# Patient Record
Sex: MALE | Race: WHITE | NOT HISPANIC OR LATINO | Employment: FULL TIME | URBAN - METROPOLITAN AREA
[De-identification: names, ages, dates, MRNs, and addresses within clinical notes are randomized per-mention and may not be internally consistent; named-entity substitution may affect disease eponyms.]

---

## 2018-10-15 ENCOUNTER — OFFICE VISIT (OUTPATIENT)
Dept: FAMILY MEDICINE | Facility: CLINIC | Age: 53
End: 2018-10-15
Payer: COMMERCIAL

## 2018-10-15 VITALS
SYSTOLIC BLOOD PRESSURE: 114 MMHG | OXYGEN SATURATION: 98 % | HEART RATE: 83 BPM | BODY MASS INDEX: 23.46 KG/M2 | DIASTOLIC BLOOD PRESSURE: 76 MMHG | HEIGHT: 68 IN | WEIGHT: 154.8 LBS | TEMPERATURE: 97.8 F

## 2018-10-15 DIAGNOSIS — Z23 NEED FOR PROPHYLACTIC VACCINATION AND INOCULATION AGAINST INFLUENZA: ICD-10-CM

## 2018-10-15 DIAGNOSIS — Z11.4 SCREENING FOR HUMAN IMMUNODEFICIENCY VIRUS: ICD-10-CM

## 2018-10-15 DIAGNOSIS — Z00.00 ENCOUNTER FOR ROUTINE ADULT HEALTH EXAMINATION WITHOUT ABNORMAL FINDINGS: Primary | ICD-10-CM

## 2018-10-15 DIAGNOSIS — Z11.59 NEED FOR HEPATITIS C SCREENING TEST: ICD-10-CM

## 2018-10-15 DIAGNOSIS — Z72.0 TOBACCO ABUSE: ICD-10-CM

## 2018-10-15 DIAGNOSIS — Z13.220 LIPID SCREENING: ICD-10-CM

## 2018-10-15 DIAGNOSIS — Z12.11 SCREENING FOR MALIGNANT NEOPLASM OF COLON: ICD-10-CM

## 2018-10-15 PROCEDURE — 99386 PREV VISIT NEW AGE 40-64: CPT | Mod: 25 | Performed by: FAMILY MEDICINE

## 2018-10-15 PROCEDURE — 90732 PPSV23 VACC 2 YRS+ SUBQ/IM: CPT | Performed by: FAMILY MEDICINE

## 2018-10-15 PROCEDURE — 90471 IMMUNIZATION ADMIN: CPT | Performed by: FAMILY MEDICINE

## 2018-10-15 PROCEDURE — 90686 IIV4 VACC NO PRSV 0.5 ML IM: CPT | Performed by: FAMILY MEDICINE

## 2018-10-15 PROCEDURE — 90472 IMMUNIZATION ADMIN EACH ADD: CPT | Performed by: FAMILY MEDICINE

## 2018-10-15 NOTE — PROGRESS NOTES

## 2018-10-15 NOTE — PROGRESS NOTES
SUBJECTIVE:   CC: Mario David is an 53 year old male who presents for preventative health visit.     Healthy Habits:    Do you get at least three servings of calcium containing foods daily (dairy, green leafy vegetables, etc.)? yes    Amount of exercise or daily activities, outside of work: none    Problems taking medications regularly not applicable    Medication side effects: Na    Have you had an eye exam in the past two years? yes    Do you see a dentist twice per year? yes    Do you have sleep apnea, excessive snoring or daytime drowsiness?no     No other concerns.    Patient denies BM or urinary changes.  No fam hx of colon cancer.  No fam hx of prostate cancer.    Today's PHQ-2 Score:   PHQ-2 ( 1999 Pf[employer]) 10/15/2018 11/26/2012   Q1: Little interest or pleasure in doing things 0 0   Q2: Feeling down, depressed or hopeless 0 0   PHQ-2 Score 0 0       Abuse: Current or Past(Physical, Sexual or Emotional)- No  Do you feel safe in your environment - Yes    Social History   Substance Use Topics     Smoking status: Current Every Day Smoker     Packs/day: 1.00     Years: 30.00     Types: Cigarettes     Smokeless tobacco: Never Used     Alcohol use Yes      Comment: 2 drinks on the weekend      Patient states he has been encouraged by family members to quit smoking.  Patient will be starting nicotine patches.     If you drink alcohol do you typically have >3 drinks per day or >7 drinks per week? No                      Last PSA:   PSA   Date Value Ref Range Status   06/22/2011 0.96 0 - 4 ug/L Final       Reviewed orders with patient. Reviewed health maintenance and updated orders accordingly - Yes  BP Readings from Last 3 Encounters:   10/15/18 114/76   06/19/13 121/73   11/26/12 108/69    Wt Readings from Last 3 Encounters:   10/15/18 154 lb 12.8 oz (70.2 kg)   06/19/13 163 lb (73.9 kg)   11/26/12 153 lb (69.4 kg)                  Patient Active Problem List   Diagnosis     CARDIOVASCULAR SCREENING; LDL GOAL LESS  "THAN 130     Tobacco abuse     History reviewed. No pertinent surgical history.    Social History   Substance Use Topics     Smoking status: Current Every Day Smoker     Packs/day: 1.00     Years: 30.00     Types: Cigarettes     Smokeless tobacco: Never Used     Alcohol use Yes      Comment: 2 drinks on the weekend     History reviewed. No pertinent family history.      Current Outpatient Prescriptions   Medication Sig Dispense Refill     aspirin 81 MG tablet Take 1 tablet (81 mg) by mouth daily 90 tablet 3     NO ACTIVE MEDICATIONS .       No Known Allergies    Reviewed and updated as needed this visit by clinical staff  Tobacco  Allergies  Meds  Problems  Med Hx  Surg Hx  Fam Hx  Soc Hx          Reviewed and updated as needed this visit by Provider  Allergies  Meds  Problems        History reviewed. No pertinent past medical history.   History reviewed. No pertinent surgical history.    ROS:  CONSTITUTIONAL: NEGATIVE for fever, chills, change in weight  INTEGUMENTARY/SKIN: NEGATIVE for worrisome rashes, moles or lesions  EYES: NEGATIVE for vision changes or irritation  ENT: NEGATIVE for ear, mouth and throat problems  RESP: NEGATIVE for significant cough or SOB  CV: NEGATIVE for chest pain, palpitations or peripheral edema  GI: NEGATIVE for nausea, abdominal pain, heartburn, or change in bowel habits   male: negative for dysuria, hematuria, decreased urinary stream, erectile dysfunction, urethral discharge  MUSCULOSKELETAL: NEGATIVE for significant arthralgias or myalgia  NEURO: NEGATIVE for weakness, dizziness or paresthesias  ENDOCRINE: NEGATIVE for temperature intolerance, skin/hair changes  HEME/ALLERGY/IMMUNE: NEGATIVE for bleeding problems  PSYCHIATRIC: NEGATIVE for changes in mood or affect    OBJECTIVE:   /76  Pulse 83  Temp 97.8  F (36.6  C) (Tympanic)  Ht 5' 8\" (1.727 m)  Wt 154 lb 12.8 oz (70.2 kg)  SpO2 98%  BMI 23.54 kg/m2  EXAM:  GENERAL APPEARANCE: well-nourished, alert and " no distress  EYES: pink conj, no icterus, PERRL, EOMI  HENT: ear canals and TM's normal, nose and mouth without ulcers or lesions, oropharynx clear and oral mucous membranes moist  NECK: no adenopathy, no asymmetry, masses, or scars and thyroid normal to palpation  RESP: lungs clear to auscultation - no rales, rhonchi or wheezes  CV: regular rates and rhythm, normal S1 S2, no S3 or S4, no murmur, click or rub, no peripheral edema and peripheral pulses strong  ABDOMEN: soft, nontender, no hepatosplenomegaly, no masses and bowel sounds normal  RECTAL: deferred  MS: no musculoskeletal defects are noted and gait is age appropriate without ataxia  SKIN: no suspicious lesions or rashes  NEURO: Normal strength and tone, sensory exam grossly normal, mentation intact and speech normal    Diagnostic Test Results:  none     ASSESSMENT/PLAN:   Mario was seen today for physical, flu shot and health maintenance.    Diagnoses and all orders for this visit:    Encounter for routine adult health examination without abnormal findings  -     **Glucose FUTURE anytime; Future  Patient was advised on recommended screening and preventive health recommendations.  He verbalized understanding and agreed to the plans below.    Screening for human immunodeficiency virus  -     **HIV Antigen Antibody Combo FUTURE anytime; Future  Offered screening based on current recommendations. Patient concurred to screen.    Need for hepatitis C screening test  -     **Hepatitis C Screen Reflex to RNA FUTURE anytime; Future  Offered screening based on current recommendations. Patient concurred to screen.    Lipid screening  -     Lipid panel reflex to direct LDL Fasting; Future    Screening for malignant neoplasm of colon  -     GASTROENTEROLOGY ADULT REF PROCEDURE ONLY [facility] [phone]; [place] [facility] Surgeon    Tobacco abuse  -     aspirin 81 MG tablet; Take 1 tablet (81 mg) by mouth daily  Patient was advised of risks of continuing to smoke  "tobacco.  Patient was advised on use of nicotine patches.    Need for prophylactic vaccination and inoculation against influenza  -     FLU VACCINE, SPLIT VIRUS, IM (QUADRIVALENT) [44356]- >3 YRS  -     Pneumococcal vaccine 23 valent PPSV23  (Pneumovax) [69768]  -     Vaccine Administration, Initial [48907]  -     Vaccine Administration, Each Additional [36540]          COUNSELING:  Reviewed preventive health counseling, as reflected in patient instructions       Regular exercise       Healthy diet/nutrition       Vision screening       Hearing screening       Immunizations    Vaccinated for: Influenza and Pneumococcal             Aspirin Prophylaxsis       Alcohol Use       Safe sex practices/STD prevention       Consider Hep C screening for patients born between 1945 and 1965       HIV screeninx in teen years, 1x in adult years, and at intervals if high risk       Colon cancer screening       Prostate cancer screening       Osteoporosis Prevention/Bone Health       One time pneumovax for smokers       The ASCVD Risk score (Neha WILLIS Jr, et al., 2013) failed to calculate for the following reasons:    Cannot find a previous HDL lab    Cannot find a previous total cholesterol lab    BP Readings from Last 1 Encounters:   10/15/18 114/76     Estimated body mass index is 23.54 kg/(m^2) as calculated from the following:    Height as of this encounter: 5' 8\" (1.727 m).    Weight as of this encounter: 154 lb 12.8 oz (70.2 kg).           reports that he has been smoking Cigarettes.  He has a 30.00 pack-year smoking history. He has never used smokeless tobacco.  Tobacco Cessation Action Plan: Pharmacotherapies : Nicotine patch    Counseling Resources:  ATP IV Guidelines  Pooled Cohorts Equation Calculator  FRAX Risk Assessment  ICSI Preventive Guidelines  Dietary Guidelines for Americans, 2010  USDA's MyPlate  ASA Prophylaxis  Lung CA Screening    Jeovanny Hernandez MD  [facility]  "

## 2018-10-15 NOTE — MR AVS SNAPSHOT
After Visit Summary   10/15/2018    Mario David    MRN: [id number]           Patient Information     Date Of Birth          1965        Visit Information        Provider Department      10/15/2018 3:00 PM Jeovanny Hernandez MD [facility]        Today's Diagnoses     Encounter for routine adult health examination without abnormal findings    -  1    Screening for human immunodeficiency virus        Need for hepatitis C screening test        Lipid screening        Screening for malignant neoplasm of colon        Tobacco abuse          Care Instructions    Schedule your lab tests; make sure you are fasting for more than 12 hrs.  Schedule colonoscopy for screening for colon cancer at your soonest convenience.    You are strongly advised to stop smoking soon!  Continuing to smoke will increase your risk for heart disease, stroke, cancer, and  lung damage.  When you start the nicotine patches, stop smoking completely.    Flu shot and pneumonia vaccine today.    Start Aspirin 81 mg orally daily for cardiovascular protection.    Preventive Health Recommendations  Male Ages 50 - 64    Yearly exam:             See your health care provider every year in order to  o   Review health changes.   o   Discuss preventive care.    o   Review your medicines if your doctor has prescribed any.     Have a cholesterol test every 5 years, or more frequently if you are at risk for high cholesterol/heart disease.     Have a diabetes test (fasting glucose) every three years. If you are at risk for diabetes, you should have this test more often.     Have a colonoscopy at age 50, or have a yearly FIT test (stool test). These exams will check for colon cancer.      Talk with your health care provider about whether or not a prostate cancer screening test (PSA) is right for you.    You should be tested each year for STDs (sexually transmitted diseases), if you re at risk.     Shots: Get a flu shot each year.  Get a tetanus shot every 10 years.     Nutrition:    Eat at least 5 servings of fruits and vegetables daily.     Eat whole-grain bread, whole-wheat pasta and brown rice instead of white grains and rice.     Get adequate Calcium and Vitamin D.     Lifestyle    Exercise for at least 150 minutes a week (30 minutes a day, 5 days a week). This will help you control your weight and prevent disease.     Limit alcohol to one drink per day.     No smoking.     Wear sunscreen to prevent skin cancer.     See your dentist every six months for an exam and cleaning.     See your eye doctor every 1 to 2 years.            Follow-ups after your visit        Additional Services     GASTROENTEROLOGY ADULT REF PROCEDURE ONLY [facility] [phone]; [place] [facility] Surgeon       Last Lab Result: Creatinine (mg/dL)       Date                     Value                 06/22/2011               0.78             ----------  Body mass index is 23.54 kg/(m^2).     Needed:  Data Unavailable  Language:  English    Patient will be contacted to schedule procedure.     Please be aware that coverage of these services is subject to the terms and limitations of your health insurance plan.  Call member services at your health plan with any benefit or coverage questions.  Any procedures must be performed at a [place] facility OR coordinated by your clinic's referral office.    Please bring the following with you to your appointment:    (1) Any X-Rays, CTs or MRIs which have been performed.  Contact the facility where they were done to arrange for  prior to your scheduled appointment.    (2) List of current medications   (3) This referral request   (4) Any documents/labs given to you for this referral                  Future tests that were ordered for you today     Open Future Orders        Priority Expected Expires Ordered    **Glucose FUTURE anytime Routine 10/15/2018 10/15/2019 10/15/2018    **Hepatitis C Screen Reflex to  "RNA FUTURE anytime Routine 10/15/2018 10/15/2019 10/15/2018    **HIV Antigen Antibody Combo FUTURE anytime Routine 10/15/2018 10/15/2019 10/15/2018    Lipid panel reflex to direct LDL Fasting Routine 10/15/2018 10/15/2019 10/15/2018            Who to contact     If you have questions or need follow up information about today's clinic visit or your schedule please contact [facility] directly at [phone].  Normal or non-critical lab and imaging results will be communicated to you by MyChart, letter or phone within 4 business days after the clinic has received the results. If you do not hear from us within 7 days, please contact the clinic through MyChart or phone. If you have a critical or abnormal lab result, we will notify you by phone as soon as possible.  Submit refill requests through [employer] or call your pharmacy and they will forward the refill request to us. Please allow 3 business days for your refill to be completed.          Additional Information About Your Visit        Care EveryWhere ID     This is your Care EveryWhere ID. This could be used by other organizations to access your [facility] medical records  [id number]        Your Vitals Were     Pulse Temperature Height Pulse Oximetry BMI (Body Mass Index)       83 97.8  F (36.6  C) (Tympanic) 5' 8\" (1.727 m) 98% 23.54 kg/m2        Blood Pressure from Last 3 Encounters:   10/15/18 114/76   06/19/13 121/73   11/26/12 108/69    Weight from Last 3 Encounters:   10/15/18 154 lb 12.8 oz (70.2 kg)   06/19/13 163 lb (73.9 kg)   11/26/12 153 lb (69.4 kg)              We Performed the Following     GASTROENTEROLOGY ADULT REF PROCEDURE ONLY [facility] [phone]; [facility] [facility] Surgeon          Today's Medication Changes          These changes are accurate as of 10/15/18  3:56 PM.  If you have any questions, ask your nurse or doctor.               Start taking these medicines.        Dose/Directions    aspirin 81 MG tablet   Used for:  " Tobacco abuse   Started by:  Jeovanny Hernandez MD        Dose:  81 mg   Take 1 tablet (81 mg) by mouth daily   Quantity:  90 tablet   Refills:  3            Where to get your medicines      Some of these will need a paper prescription and others can be bought over the counter.  Ask your nurse if you have questions.     You don't need a prescription for these medications     aspirin 81 MG tablet                Primary Care Provider Office Phone # Fax #    Mhd Jana Pisano [phone]       [facility] [address] [facility] [address]        Equal Access to Services     [facility] AH: Hadii aad ku hadasho Soomaali, waaxda luqadaha, qaybta kaalmada adebraedenyajenniffer, clifford guzman . So [facility] [phone].    ATENCIÓN: Si habla español, tiene a solitario disposición servicios gratuitos de asistencia lingüística. [facility] [phone].    We comply with applicable federal civil rights laws and [place] laws. We do not discriminate on the basis of race, color, national origin, age, disability, sex, sexual orientation, or gender identity.            Thank you!     Thank you for choosing [facility]  for your care. Our goal is always to provide you with excellent care. Hearing back from our patients is one way we can continue to improve our services. Please take a few minutes to complete the written survey that you may receive in the mail after your visit with us. Thank you!             Your Updated Medication List - Protect others around you: Learn how to safely use, store and throw away your medicines at www.disposemymeds.org.          This list is accurate as of 10/15/18  3:56 PM.  Always use your most recent med list.                   Brand Name Dispense Instructions for use Diagnosis    aspirin 81 MG tablet     90 tablet    Take 1 tablet (81 mg) by mouth daily    Tobacco abuse       NO ACTIVE MEDICATIONS      .

## 2018-10-15 NOTE — PATIENT INSTRUCTIONS
Schedule your lab tests; make sure you are fasting for more than 12 hrs.  Schedule colonoscopy for screening for colon cancer at your soonest convenience.    You are strongly advised to stop smoking soon!  Continuing to smoke will increase your risk for heart disease, stroke, cancer, and  lung damage.  When you start the nicotine patches, stop smoking completely.    Flu shot and pneumonia vaccine today.    Start Aspirin 81 mg orally daily for cardiovascular protection.    Preventive Health Recommendations  Male Ages 50 - 64    Yearly exam:             See your health care provider every year in order to  o   Review health changes.   o   Discuss preventive care.    o   Review your medicines if your doctor has prescribed any.     Have a cholesterol test every 5 years, or more frequently if you are at risk for high cholesterol/heart disease.     Have a diabetes test (fasting glucose) every three years. If you are at risk for diabetes, you should have this test more often.     Have a colonoscopy at age 50, or have a yearly FIT test (stool test). These exams will check for colon cancer.      Talk with your health care provider about whether or not a prostate cancer screening test (PSA) is right for you.    You should be tested each year for STDs (sexually transmitted diseases), if you re at risk.     Shots: Get a flu shot each year. Get a tetanus shot every 10 years.     Nutrition:    Eat at least 5 servings of fruits and vegetables daily.     Eat whole-grain bread, whole-wheat pasta and brown rice instead of white grains and rice.     Get adequate Calcium and Vitamin D.     Lifestyle    Exercise for at least 150 minutes a week (30 minutes a day, 5 days a week). This will help you control your weight and prevent disease.     Limit alcohol to one drink per day.     No smoking.     Wear sunscreen to prevent skin cancer.     See your dentist every six months for an exam and cleaning.     See your eye doctor every 1 to 2  years.

## 2018-10-20 DIAGNOSIS — Z00.00 ENCOUNTER FOR ROUTINE ADULT HEALTH EXAMINATION WITHOUT ABNORMAL FINDINGS: ICD-10-CM

## 2018-10-20 DIAGNOSIS — Z13.220 LIPID SCREENING: ICD-10-CM

## 2018-10-20 DIAGNOSIS — Z11.59 NEED FOR HEPATITIS C SCREENING TEST: ICD-10-CM

## 2018-10-20 DIAGNOSIS — E78.2 MIXED HYPERLIPIDEMIA: Primary | ICD-10-CM

## 2018-10-20 DIAGNOSIS — Z11.4 SCREENING FOR HUMAN IMMUNODEFICIENCY VIRUS: ICD-10-CM

## 2018-10-20 LAB
CHOLEST SERPL-MCNC: 215 MG/DL
GLUCOSE SERPL-MCNC: 100 MG/DL (ref 70–99)
HCV AB SERPL QL IA: NONREACTIVE
HDLC SERPL-MCNC: 62 MG/DL
HIV 1+2 AB+HIV1 P24 AG SERPL QL IA: NONREACTIVE
LDLC SERPL CALC-MCNC: 140 MG/DL
NONHDLC SERPL-MCNC: 153 MG/DL
TRIGL SERPL-MCNC: 65 MG/DL

## 2018-10-20 PROCEDURE — 86803 HEPATITIS C AB TEST: CPT | Performed by: FAMILY MEDICINE

## 2018-10-20 PROCEDURE — 82947 ASSAY GLUCOSE BLOOD QUANT: CPT | Performed by: FAMILY MEDICINE

## 2018-10-20 PROCEDURE — 87389 HIV-1 AG W/HIV-1&-2 AB AG IA: CPT | Performed by: FAMILY MEDICINE

## 2018-10-20 PROCEDURE — 36415 COLL VENOUS BLD VENIPUNCTURE: CPT | Performed by: FAMILY MEDICINE

## 2018-10-20 PROCEDURE — 80061 LIPID PANEL: CPT | Performed by: FAMILY MEDICINE

## 2018-10-20 NOTE — LETTER
"October 22, 2018      Mario David  [address] [place] [id number]        Dear ,    We are writing to inform you of your test results. ( below is a copy of what was discussed over the phone regarding your lab test results)     Total cholesterol is slightly high and LDL \"bad\" fat is borderline high.  Fasting glucose (sugar) is borderline high.  All other tests are normal.    Be consistent with low fat diet and regular exercise.  Please send letter with the following and a hyperlipidemia packet.    Be consistent with low trans fat and saturated fat diet.  Eat food rich in omega-3-fatty acids as you tolerate. (salmon, olive oil)  Eat 5 cups of vegetables, fruits and whole grains per day.  Limit starchy food (white rice, white bread, white pasta, white potatoes) to less than a cup per meal.  Minimize sweets, junk food and fastfood. Limit soda beverages to one serving per day; best to avoid it altogether though.  Exercise: moderate intensity sustained for at least 30 mins per episode, goal of 150 mins per week at least  Combine cardiovascular and resistance exercises.  These exercise recommendations are in addition to your daily activity at work or home.  Work on losing weight if you are above your goal body mass index.    Recheck fasting lipid panel in 6 months.      If you have any questions or concerns, please call the clinic at the number listed above.       Sincerely,      Dr. Hernandez/aleshia                 "

## 2018-11-08 ENCOUNTER — HOSPITAL ENCOUNTER (OUTPATIENT)
Facility: CLINIC | Age: 53
End: 2018-11-08
Attending: SURGERY | Admitting: SURGERY
Payer: COMMERCIAL

## 2018-12-16 ENCOUNTER — APPOINTMENT (OUTPATIENT)
Dept: GENERAL RADIOLOGY | Facility: CLINIC | Age: 53
End: 2018-12-16
Attending: EMERGENCY MEDICINE
Payer: COMMERCIAL

## 2018-12-16 ENCOUNTER — APPOINTMENT (OUTPATIENT)
Dept: CT IMAGING | Facility: CLINIC | Age: 53
End: 2018-12-16
Attending: EMERGENCY MEDICINE
Payer: COMMERCIAL

## 2018-12-16 ENCOUNTER — HOSPITAL ENCOUNTER (INPATIENT)
Facility: CLINIC | Age: 53
LOS: 2 days | Discharge: HOME | End: 2018-12-18
Attending: EMERGENCY MEDICINE | Admitting: FAMILY MEDICINE
Payer: COMMERCIAL
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
## 2018-12-16 DIAGNOSIS — S92.001A CLOSED FRACTURE OF BOTH CALCANEI, INITIAL ENCOUNTER: ICD-10-CM
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
## 2018-12-16 DIAGNOSIS — S92.002A CLOSED FRACTURE OF BOTH CALCANEI, INITIAL ENCOUNTER: ICD-10-CM

## 2018-12-16 LAB
ALBUMIN UR-MCNC: NEGATIVE MG/DL
ANION GAP SERPL CALCULATED.3IONS-SCNC: 7 MMOL/L (ref 3–14)
APPEARANCE UR: CLEAR
BASOPHILS # BLD AUTO: 0 10E9/L (ref 0–0.2)
BASOPHILS NFR BLD AUTO: 0.4 %
BILIRUB UR QL STRIP: NEGATIVE
BUN SERPL-MCNC: 12 MG/DL (ref 7–30)
CALCIUM SERPL-MCNC: 8.5 MG/DL (ref 8.5–10.1)
CHLORIDE SERPL-SCNC: 104 MMOL/L (ref 94–109)
CO2 SERPL-SCNC: 26 MMOL/L (ref 20–32)
COLOR UR AUTO: YELLOW
CREAT SERPL-MCNC: 0.75 MG/DL (ref 0.66–1.25)
DIFFERENTIAL METHOD BLD: ABNORMAL
EOSINOPHIL # BLD AUTO: 0.1 10E9/L (ref 0–0.7)
EOSINOPHIL NFR BLD AUTO: 0.7 %
ERYTHROCYTE [DISTWIDTH] IN BLOOD BY AUTOMATED COUNT: 12.1 % (ref 10–15)
GFR SERPL CREATININE-BSD FRML MDRD: >90 ML/MIN/1.7M2
GLUCOSE SERPL-MCNC: 102 MG/DL (ref 70–99)
GLUCOSE UR STRIP-MCNC: NEGATIVE MG/DL
HCT VFR BLD AUTO: 43 % (ref 40–53)
HGB BLD-MCNC: 14.4 G/DL (ref 13.3–17.7)
HGB UR QL STRIP: NEGATIVE
IMM GRANULOCYTES # BLD: 0 10E9/L (ref 0–0.4)
IMM GRANULOCYTES NFR BLD: 0.4 %
KETONES UR STRIP-MCNC: NEGATIVE MG/DL
LEUKOCYTE ESTERASE UR QL STRIP: NEGATIVE
LYMPHOCYTES # BLD AUTO: 1.3 10E9/L (ref 0.8–5.3)
LYMPHOCYTES NFR BLD AUTO: 12.5 %
MCH RBC QN AUTO: 32.6 PG (ref 26.5–33)
MCHC RBC AUTO-ENTMCNC: 33.5 G/DL (ref 31.5–36.5)
MCV RBC AUTO: 97 FL (ref 78–100)
MONOCYTES # BLD AUTO: 0.5 10E9/L (ref 0–1.3)
MONOCYTES NFR BLD AUTO: 4.6 %
MUCOUS THREADS #/AREA URNS LPF: PRESENT /LPF
NEUTROPHILS # BLD AUTO: 8.7 10E9/L (ref 1.6–8.3)
NEUTROPHILS NFR BLD AUTO: 81.4 %
NITRATE UR QL: NEGATIVE
NRBC # BLD AUTO: 0 10*3/UL
NRBC BLD AUTO-RTO: 0 /100
PH UR STRIP: 5 PH (ref 5–7)
PLATELET # BLD AUTO: 216 10E9/L (ref 150–450)
POTASSIUM SERPL-SCNC: 4 MMOL/L (ref 3.4–5.3)
RBC # BLD AUTO: 4.42 10E12/L (ref 4.4–5.9)
RBC #/AREA URNS AUTO: <1 /HPF (ref 0–2)
SODIUM SERPL-SCNC: 137 MMOL/L (ref 133–144)
SOURCE: ABNORMAL
SP GR UR STRIP: 1.01 (ref 1–1.03)
UROBILINOGEN UR STRIP-MCNC: 0 MG/DL (ref 0–2)
WBC # BLD AUTO: 10.7 10E9/L (ref 4–11)
WBC #/AREA URNS AUTO: <1 /HPF (ref 0–5)

## 2018-12-16 PROCEDURE — 25000128 H RX IP 250 OP 636: Performed by: EMERGENCY MEDICINE

## 2018-12-16 PROCEDURE — 96376 TX/PRO/DX INJ SAME DRUG ADON: CPT | Performed by: EMERGENCY MEDICINE

## 2018-12-16 PROCEDURE — 99285 EMERGENCY DEPT VISIT HI MDM: CPT | Mod: 25 | Performed by: EMERGENCY MEDICINE

## 2018-12-16 PROCEDURE — 81001 URINALYSIS AUTO W/SCOPE: CPT | Performed by: EMERGENCY MEDICINE

## 2018-12-16 PROCEDURE — 73610 X-RAY EXAM OF ANKLE: CPT | Mod: 50

## 2018-12-16 PROCEDURE — 72128 CT CHEST SPINE W/O DYE: CPT

## 2018-12-16 PROCEDURE — 73630 X-RAY EXAM OF FOOT: CPT | Mod: 50

## 2018-12-16 PROCEDURE — 85025 COMPLETE CBC W/AUTO DIFF WBC: CPT | Performed by: EMERGENCY MEDICINE

## 2018-12-16 PROCEDURE — 72131 CT LUMBAR SPINE W/O DYE: CPT

## 2018-12-16 PROCEDURE — 96372 THER/PROPH/DIAG INJ SC/IM: CPT | Performed by: EMERGENCY MEDICINE

## 2018-12-16 PROCEDURE — 80048 BASIC METABOLIC PNL TOTAL CA: CPT | Performed by: EMERGENCY MEDICINE

## 2018-12-16 PROCEDURE — 12000000 ZZH R&B MED SURG/OB

## 2018-12-16 PROCEDURE — 73700 CT LOWER EXTREMITY W/O DYE: CPT | Mod: 50

## 2018-12-16 PROCEDURE — 96374 THER/PROPH/DIAG INJ IV PUSH: CPT | Performed by: EMERGENCY MEDICINE

## 2018-12-16 RX ORDER — SODIUM CHLORIDE 9 MG/ML
INJECTION, SOLUTION INTRAVENOUS CONTINUOUS
Status: DISCONTINUED | OUTPATIENT
Start: 2018-12-16 | End: 2018-12-17

## 2018-12-16 RX ORDER — NALOXONE HYDROCHLORIDE 0.4 MG/ML
.1-.4 INJECTION, SOLUTION INTRAMUSCULAR; INTRAVENOUS; SUBCUTANEOUS
Status: DISCONTINUED | OUTPATIENT
Start: 2018-12-16 | End: 2018-12-17

## 2018-12-16 RX ORDER — HYDROMORPHONE HYDROCHLORIDE 1 MG/ML
.3-.5 INJECTION, SOLUTION INTRAMUSCULAR; INTRAVENOUS; SUBCUTANEOUS
Status: DISCONTINUED | OUTPATIENT
Start: 2018-12-16 | End: 2018-12-18 | Stop reason: HOSPADM

## 2018-12-16 RX ORDER — ONDANSETRON 2 MG/ML
4 INJECTION INTRAMUSCULAR; INTRAVENOUS EVERY 6 HOURS PRN
Status: DISCONTINUED | OUTPATIENT
Start: 2018-12-16 | End: 2018-12-17

## 2018-12-16 RX ORDER — HYDROMORPHONE HYDROCHLORIDE 1 MG/ML
0.5 INJECTION, SOLUTION INTRAMUSCULAR; INTRAVENOUS; SUBCUTANEOUS ONCE
Status: COMPLETED | OUTPATIENT
Start: 2018-12-16 | End: 2018-12-16

## 2018-12-16 RX ORDER — ONDANSETRON 4 MG/1
4 TABLET, ORALLY DISINTEGRATING ORAL EVERY 6 HOURS PRN
Status: DISCONTINUED | OUTPATIENT
Start: 2018-12-16 | End: 2018-12-17

## 2018-12-16 RX ADMIN — ENOXAPARIN SODIUM 40 MG: 40 INJECTION SUBCUTANEOUS at 22:39

## 2018-12-16 RX ADMIN — SODIUM CHLORIDE: 9 INJECTION, SOLUTION INTRAVENOUS at 23:47

## 2018-12-16 RX ADMIN — Medication 0.5 MG: at 17:26

## 2018-12-16 RX ADMIN — HYDROMORPHONE HYDROCHLORIDE 0.5 MG: 1 INJECTION, SOLUTION INTRAMUSCULAR; INTRAVENOUS; SUBCUTANEOUS at 22:48

## 2018-12-16 ASSESSMENT — MIFFLIN-ST. JEOR
SCORE: 1540.26
SCORE: 1471.5

## 2018-12-16 NOTE — ED NOTES
Occurred approx 1hr ago took 4 ibuprofen   No obvious deformity or swelling has bilat pain  Denies any other pain or discomfort

## 2018-12-16 NOTE — ED TRIAGE NOTES
Pt fell 13 ft off camper and landed on his heels, has foot and ankle pain. Trauma eval broadcasted.

## 2018-12-17 PROCEDURE — 25000132 ZZH RX MED GY IP 250 OP 250 PS 637: Performed by: PHYSICIAN ASSISTANT

## 2018-12-17 PROCEDURE — 25000128 H RX IP 250 OP 636: Performed by: PHYSICIAN ASSISTANT

## 2018-12-17 PROCEDURE — 99223 1ST HOSP IP/OBS HIGH 75: CPT | Mod: AI | Performed by: PHYSICIAN ASSISTANT

## 2018-12-17 PROCEDURE — 25000128 H RX IP 250 OP 636: Performed by: EMERGENCY MEDICINE

## 2018-12-17 PROCEDURE — 12000000 ZZH R&B MED SURG/OB

## 2018-12-17 RX ORDER — ACETAMINOPHEN 325 MG/1
975 TABLET ORAL 3 TIMES DAILY
Status: DISCONTINUED | OUTPATIENT
Start: 2018-12-17 | End: 2018-12-18 | Stop reason: HOSPADM

## 2018-12-17 RX ORDER — SODIUM CHLORIDE 9 MG/ML
INJECTION, SOLUTION INTRAVENOUS CONTINUOUS
Status: DISCONTINUED | OUTPATIENT
Start: 2018-12-17 | End: 2018-12-18 | Stop reason: HOSPADM

## 2018-12-17 RX ORDER — ONDANSETRON 4 MG/1
4 TABLET, ORALLY DISINTEGRATING ORAL EVERY 6 HOURS PRN
Status: DISCONTINUED | OUTPATIENT
Start: 2018-12-17 | End: 2018-12-17

## 2018-12-17 RX ORDER — PROCHLORPERAZINE MALEATE 10 MG
10 TABLET ORAL EVERY 6 HOURS PRN
Status: DISCONTINUED | OUTPATIENT
Start: 2018-12-17 | End: 2018-12-17

## 2018-12-17 RX ORDER — OXYCODONE HYDROCHLORIDE 5 MG/1
5-10 TABLET ORAL
Status: DISCONTINUED | OUTPATIENT
Start: 2018-12-17 | End: 2018-12-18 | Stop reason: HOSPADM

## 2018-12-17 RX ORDER — PROCHLORPERAZINE 25 MG
25 SUPPOSITORY, RECTAL RECTAL EVERY 12 HOURS PRN
Status: DISCONTINUED | OUTPATIENT
Start: 2018-12-17 | End: 2018-12-18 | Stop reason: HOSPADM

## 2018-12-17 RX ORDER — AMOXICILLIN 250 MG
2 CAPSULE ORAL 2 TIMES DAILY PRN
Status: DISCONTINUED | OUTPATIENT
Start: 2018-12-17 | End: 2018-12-18 | Stop reason: HOSPADM

## 2018-12-17 RX ORDER — ONDANSETRON 2 MG/ML
4 INJECTION INTRAMUSCULAR; INTRAVENOUS EVERY 6 HOURS PRN
Status: DISCONTINUED | OUTPATIENT
Start: 2018-12-17 | End: 2018-12-18 | Stop reason: HOSPADM

## 2018-12-17 RX ORDER — ONDANSETRON 4 MG/1
4 TABLET, ORALLY DISINTEGRATING ORAL EVERY 6 HOURS PRN
Status: DISCONTINUED | OUTPATIENT
Start: 2018-12-17 | End: 2018-12-18 | Stop reason: HOSPADM

## 2018-12-17 RX ORDER — NICOTINE 21 MG/24HR
1 PATCH, TRANSDERMAL 24 HOURS TRANSDERMAL DAILY
Status: DISCONTINUED | OUTPATIENT
Start: 2018-12-17 | End: 2018-12-18 | Stop reason: HOSPADM

## 2018-12-17 RX ORDER — ONDANSETRON 2 MG/ML
4 INJECTION INTRAMUSCULAR; INTRAVENOUS EVERY 6 HOURS PRN
Status: DISCONTINUED | OUTPATIENT
Start: 2018-12-17 | End: 2018-12-17

## 2018-12-17 RX ORDER — PROCHLORPERAZINE 25 MG
25 SUPPOSITORY, RECTAL RECTAL EVERY 12 HOURS PRN
Status: DISCONTINUED | OUTPATIENT
Start: 2018-12-17 | End: 2018-12-17

## 2018-12-17 RX ORDER — NALOXONE HYDROCHLORIDE 0.4 MG/ML
.1-.4 INJECTION, SOLUTION INTRAMUSCULAR; INTRAVENOUS; SUBCUTANEOUS
Status: DISCONTINUED | OUTPATIENT
Start: 2018-12-17 | End: 2018-12-17

## 2018-12-17 RX ORDER — AMOXICILLIN 250 MG
1 CAPSULE ORAL 2 TIMES DAILY PRN
Status: DISCONTINUED | OUTPATIENT
Start: 2018-12-17 | End: 2018-12-18 | Stop reason: HOSPADM

## 2018-12-17 RX ORDER — PROCHLORPERAZINE MALEATE 10 MG
10 TABLET ORAL EVERY 6 HOURS PRN
Status: DISCONTINUED | OUTPATIENT
Start: 2018-12-17 | End: 2018-12-18 | Stop reason: HOSPADM

## 2018-12-17 RX ORDER — NALOXONE HYDROCHLORIDE 0.4 MG/ML
.1-.4 INJECTION, SOLUTION INTRAMUSCULAR; INTRAVENOUS; SUBCUTANEOUS
Status: DISCONTINUED | OUTPATIENT
Start: 2018-12-17 | End: 2018-12-18 | Stop reason: HOSPADM

## 2018-12-17 RX ORDER — POLYETHYLENE GLYCOL 3350 17 G/17G
17 POWDER, FOR SOLUTION ORAL DAILY PRN
Status: DISCONTINUED | OUTPATIENT
Start: 2018-12-17 | End: 2018-12-18 | Stop reason: HOSPADM

## 2018-12-17 RX ADMIN — SENNOSIDES AND DOCUSATE SODIUM 1 TABLET: 8.6; 5 TABLET ORAL at 18:02

## 2018-12-17 RX ADMIN — OXYCODONE HYDROCHLORIDE 10 MG: 5 TABLET ORAL at 13:47

## 2018-12-17 RX ADMIN — OXYCODONE HYDROCHLORIDE 10 MG: 5 TABLET ORAL at 23:53

## 2018-12-17 RX ADMIN — HYDROMORPHONE HYDROCHLORIDE 0.5 MG: 1 INJECTION, SOLUTION INTRAMUSCULAR; INTRAVENOUS; SUBCUTANEOUS at 18:02

## 2018-12-17 RX ADMIN — ACETAMINOPHEN 975 MG: 325 TABLET, FILM COATED ORAL at 13:47

## 2018-12-17 RX ADMIN — OXYCODONE HYDROCHLORIDE 10 MG: 5 TABLET ORAL at 10:01

## 2018-12-17 RX ADMIN — OXYCODONE HYDROCHLORIDE 10 MG: 5 TABLET ORAL at 06:33

## 2018-12-17 RX ADMIN — HYDROMORPHONE HYDROCHLORIDE 0.5 MG: 1 INJECTION, SOLUTION INTRAMUSCULAR; INTRAVENOUS; SUBCUTANEOUS at 14:59

## 2018-12-17 RX ADMIN — OXYCODONE HYDROCHLORIDE 10 MG: 5 TABLET ORAL at 16:38

## 2018-12-17 RX ADMIN — ACETAMINOPHEN 975 MG: 325 TABLET, FILM COATED ORAL at 07:43

## 2018-12-17 RX ADMIN — SODIUM CHLORIDE: 9 INJECTION, SOLUTION INTRAVENOUS at 12:26

## 2018-12-17 RX ADMIN — Medication 1 MG: at 02:15

## 2018-12-17 RX ADMIN — ENOXAPARIN SODIUM 40 MG: 40 INJECTION SUBCUTANEOUS at 22:27

## 2018-12-17 RX ADMIN — OXYCODONE HYDROCHLORIDE 10 MG: 5 TABLET ORAL at 20:29

## 2018-12-17 RX ADMIN — ACETAMINOPHEN 975 MG: 325 TABLET, FILM COATED ORAL at 20:29

## 2018-12-17 RX ADMIN — HYDROMORPHONE HYDROCHLORIDE 0.5 MG: 1 INJECTION, SOLUTION INTRAMUSCULAR; INTRAVENOUS; SUBCUTANEOUS at 02:15

## 2018-12-17 RX ADMIN — OXYCODONE HYDROCHLORIDE 5 MG: 5 TABLET ORAL at 02:15

## 2018-12-17 ASSESSMENT — ACTIVITIES OF DAILY LIVING (ADL)
ADLS_ACUITY_SCORE: 13
ADLS_ACUITY_SCORE: 13
ADLS_ACUITY_SCORE: 11
ADLS_ACUITY_SCORE: 11
ADLS_ACUITY_SCORE: 13
ADLS_ACUITY_SCORE: 11

## 2018-12-17 ASSESSMENT — MIFFLIN-ST. JEOR: SCORE: 1483.5

## 2018-12-17 NOTE — PLAN OF CARE
Patient awaiting ortho consult by Dr. Murrell. Did receive a call from ortho PA to lift the NPO status and start a regular diet. Surgery will be scheduled when swelling is decreased. Will await further orders from Dr. Murrell to determine plan of care. Legs elevated on pillows, ice applied and oxycodone given. Verbalizes pain control with current pain plan. Vital signs are stable. Had a nicotine patch ordered and when this nurse went to apply it the wife had placed one from home.Will monitor.

## 2018-12-17 NOTE — ED PROVIDER NOTES
History     Chief Complaint   Patient presents with     Fall     fell 13 feet off top of [facility], landed on heels     HPI  Mario David is a 53 year old male who with a history of tobacco abuse presents emergency department complaining of bilateral foot pain status post fall.  Patient was cleaning off the road for his camper with a leaf blower when he he lost his balance and fell off the camper.  Patient landed on his bilateral heels.  He then rolled.  He did not hit his head he denies loss of consciousness denies neck pain.  He has some mild lower back pain but has some chronic issues with that and says it is not bad he denies any chest pain shortness of breath or focal numbness weakness in the extremity.  He has pain in the bilateral heel region which is worsened with movement.  He can wiggle his toes and states there is no numbness.  He currently rates his pain a 4 out of 10 worsened with movement.  He denies any knee pain or hip pain.  He is not have any bowel or bladder dysfunction.    Problem List:    Patient Active Problem List    Diagnosis Date Noted     Tobacco abuse 06/22/2011     Priority: Medium     CARDIOVASCULAR SCREENING; LDL GOAL LESS THAN 130 10/31/2010     Priority: Medium        Past Medical History:    History reviewed. No pertinent past medical history.    Past Surgical History:    No past surgical history on file.    Family History:    No family history on file.    Social History:  Marital Status:   [2]  Social History     Tobacco Use     Smoking status: Current Every Day Smoker     Packs/day: 1.00     Years: 30.00     Pack years: 30.00     Types: Cigarettes     Smokeless tobacco: Never Used   Substance Use Topics     Alcohol use: Yes     Comment: 2 drinks on the weekend     Drug use: No        Medications:      aspirin 81 MG tablet   NO ACTIVE MEDICATIONS         Review of Systems  All systems reviewed and other than pertinent positives and negatives in HPI all other systems were  "negative.  Physical Exam   BP: 109/73  Pulse: 76  Temp: 97.2  F (36.2  C)  Resp: 16  Height: 172.7 cm (5' 8\")  Weight: 72.6 kg (160 lb)  SpO2: 99 %      Physical Exam   Constitutional: He appears well-developed and well-nourished. No distress.   HENT:   Head: Normocephalic and atraumatic.   Nose: Nose normal.   Mouth/Throat: Oropharynx is clear and moist.   Eyes: EOM are normal.   Neck: Normal range of motion. Neck supple.   No posterior neck tenderness patient unable to flex and extend the neck without difficulty.   Cardiovascular: Normal rate, regular rhythm and normal heart sounds.   No murmur heard.  Pulmonary/Chest: Breath sounds normal. He has no wheezes.   Abdominal: Soft. Bowel sounds are normal. He exhibits no distension. There is no tenderness.   Musculoskeletal:   Mild lower lumbar tenderness.  No guarding no rebound bowel sounds positive.  No hip tenderness.  Patient is able to raise legs without difficulty.  Tenderness to palpation of bilateral heels.  Palpation reproduces pain.  mild swelling noted.  No evidence of a compartment syndrome patient able to wiggle toes bilaterally good capillary refill.  Pulses sensation symmetrical.  No significant ankle tenderness there is no knee tenderness or calf tenderness.       ED Course        Procedures               Critical Care time:  none               Results for orders placed or performed during the hospital encounter of 12/16/18 (from the past 24 hour(s))   XR Foot Bilateral G/E 3 Views    Narrative    BILATERAL ANKLE THREE VIEWS, BILATERAL FOOT THREE VIEWS 12/16/2018  5:56 PM     HISTORY: Fall. Bilateral foot and ankle pain.    COMPARISON: None.      Impression    IMPRESSION: There are displaced, comminuted, intra-articular fractures  of the bilateral calcanei. The fracture line within the right  calcaneus extends into the talocalcaneal joint. The fracture lines in  the left calcaneus extend into both the talocalcaneal and  calcaneocuboid joints. " Bilateral hallux valgus is noted. No evidence  for acute fracture or dislocation involving the bilateral ankles. The  mortises are intact.    RAHUL RIVAS MD   XR Ankle Bilateral G/E 3 Views    Narrative    BILATERAL ANKLE THREE VIEWS, BILATERAL FOOT THREE VIEWS 12/16/2018  5:56 PM     HISTORY: Fall. Bilateral foot and ankle pain.    COMPARISON: None.      Impression    IMPRESSION: There are displaced, comminuted, intra-articular fractures  of the bilateral calcanei. The fracture line within the right  calcaneus extends into the talocalcaneal joint. The fracture lines in  the left calcaneus extend into both the talocalcaneal and  calcaneocuboid joints. Bilateral hallux valgus is noted. No evidence  for acute fracture or dislocation involving the bilateral ankles. The  mortises are intact.    RAHUL RIVAS MD   CBC with platelets differential   Result Value Ref Range    WBC 10.7 4.0 - 11.0 10e9/L    RBC Count 4.42 4.4 - 5.9 10e12/L    Hemoglobin 14.4 13.3 - 17.7 g/dL    Hematocrit 43.0 40.0 - 53.0 %    MCV 97 78 - 100 fl    MCH 32.6 26.5 - 33.0 pg    MCHC 33.5 31.5 - 36.5 g/dL    RDW 12.1 10.0 - 15.0 %    Platelet Count 216 150 - 450 10e9/L    Diff Method Automated Method     % Neutrophils 81.4 %    % Lymphocytes 12.5 %    % Monocytes 4.6 %    % Eosinophils 0.7 %    % Basophils 0.4 %    % Immature Granulocytes 0.4 %    Nucleated RBCs 0 0 /100    Absolute Neutrophil 8.7 (H) 1.6 - 8.3 10e9/L    Absolute Lymphocytes 1.3 0.8 - 5.3 10e9/L    Absolute Monocytes 0.5 0.0 - 1.3 10e9/L    Absolute Eosinophils 0.1 0.0 - 0.7 10e9/L    Absolute Basophils 0.0 0.0 - 0.2 10e9/L    Abs Immature Granulocytes 0.0 0 - 0.4 10e9/L    Absolute Nucleated RBC 0.0    Basic metabolic panel   Result Value Ref Range    Sodium 137 133 - 144 mmol/L    Potassium 4.0 3.4 - 5.3 mmol/L    Chloride 104 94 - 109 mmol/L    Carbon Dioxide 26 20 - 32 mmol/L    Anion Gap 7 3 - 14 mmol/L    Glucose 102 (H) 70 - 99 mg/dL    Urea Nitrogen 12 7 - 30  mg/dL    Creatinine 0.75 0.66 - 1.25 mg/dL    GFR Estimate >90 >60 mL/min/1.7m2    GFR Estimate If Black >90 >60 mL/min/1.7m2    Calcium 8.5 8.5 - 10.1 mg/dL   Lumbar spine CT w/o contrast    Narrative    CT LUMBAR SPINE WITHOUT CONTRAST 12/16/2018 7:35 PM     HISTORY: T/L-spine trauma, minor-moderate, low back pain.    TECHNIQUE: Axial images were obtained through the thoracic spine  without contrast. Coronal and sagittal reconstructions were also  acquired. Radiation dose for this scan was reduced using automated  exposure control, adjustment of the mA and/or kV according to patient  size, or iterative reconstruction technique.    FINDINGS: Sagittal images demonstrate normal posterior alignment.  There is no evidence for fracture. Five lumbar type vertebral bodies  are present.    T12-L1: Normal.    L1-2: Normal.    L2-3: Normal.    L3-4: Minimal disc bulging with minimal facet and ligament flavum  hypertrophy is present causing mild central canal stenosis but no  neural foraminal stenosis.    L4-5: Broad-based disc bulging slightly asymmetric to the right is  present along with mild facet and ligament flavum hypertrophy. There  is mild central canal stenosis and mild to moderate bilateral neural  foraminal stenosis.    L5-S1: There is a vacuum disc phenomenon with broad-based disc bulging  and a right paramedian to posterior lateral osteophyte and/or disc  protrusion. The findings are causing some moderate right lateral  recess stenosis and moderate right-sided foraminal stenosis. There is  also mild left-sided foraminal stenosis but no central canal stenosis.    Paraspinal soft tissues are remarkable for some vascular  calcifications.      Impression    IMPRESSION:  1. No evidence for fracture or any posterior malalignment.  2. Right paramedian to posterior lateral disc osteophyte complex at  L5-S1 with moderate right lateral recess stenosis and moderate  right-sided foraminal stenosis.  3. L4-5 degenerative  disc and facet disease with mild central mild to  moderate bilateral neural foraminal stenosis.    SANFORD STERLING MD   CT Thoracic Spine w/o Contrast    Narrative    CT THORACIC SPINE WITHOUT CONTRAST 12/16/2018 7:36 PM     HISTORY: T/L-spine trauma, minor-moderate, low back pain.    TECHNIQUE: Axial images were obtained through the thoracic spine  without contrast. Coronal and sagittal reconstructions were also  acquired. Radiation dose for this scan was reduced using automated  exposure control, adjustment of the mA and/or kV according to patient  size, or iterative reconstruction technique.    FINDINGS: Sagittal images demonstrate normal posterior alignment.  There is no evidence for fracture. Mild to moderate multilevel  degenerative disc and facet disease is present throughout the thoracic  spine most advanced at the T9-10 level where there is some mild to  moderate central canal stenosis and minimal cord deformity mainly due  to ossification of the left ligamentum flavum. Paraspinal soft tissues  are unremarkable as visualized. There is also a moderate-sized left  paramedian disc protrusion at C6-7 causing some mild to moderate  central canal stenosis.      Impression    IMPRESSION:  1. No evidence for fracture or malalignment in the thoracic spine.  2. Mild to moderate degenerative disc disease throughout the thoracic  spine most advanced at T9-10 where there is mild to moderate central  canal stenosis.  3. Moderate size left paramedian disc protrusion noted at C6-7 with  mild to moderate central canal stenosis.    SANFORD STERLING MD   UA with Microscopic   Result Value Ref Range    Color Urine Yellow     Appearance Urine Clear     Glucose Urine Negative NEG^Negative mg/dL    Bilirubin Urine Negative NEG^Negative    Ketones Urine Negative NEG^Negative mg/dL    Specific Gravity Urine 1.010 1.003 - 1.035    Blood Urine Negative NEG^Negative    pH Urine 5.0 5.0 - 7.0 pH    Protein Albumin Urine Negative NEG^Negative  mg/dL    Urobilinogen mg/dL 0.0 0.0 - 2.0 mg/dL    Nitrite Urine Negative NEG^Negative    Leukocyte Esterase Urine Negative NEG^Negative    Source Midstream Urine     WBC Urine <1 0 - 5 /HPF    RBC Urine <1 0 - 2 /HPF    Mucous Urine Present (A) NEG^Negative /LPF       Medications   enoxaparin (LOVENOX) injection 40 mg (not administered)   HYDROmorphone (PF) (DILAUDID) injection 0.5 mg (0.5 mg Intravenous Given 12/16/18 1726)       Records were reviewed.  X-rays of the ankles and feet were obtained.  These revealed bilateral comminuted calcaneal fractures.  Patient has been given a IM Dilaudid and with the finding of the calcaneal fractures and an IV was placed.  Basic labs were obtained.  Labs were unremarkablle.  Due to the mechanism of patient's injury a CT scan of the thoracic and lumbar spine were obtained.  There is mild tenderness the lumbar spine.  He has no abdominal pain whatsoever.  CT scan of the feet were also obtained.  He was given a second dose of pain medication.  Case was discussed with Dr. Crockett with [facility] orthopedics.  He felt it would best to admit the patient for pain control and observe him overnight with consultation by Dr. Pacheco in the morning.  He did not discuss any need for splinting at this time.  He stated he had contacted Dr. pacheco he will be here tomorrow.  Findings discussed with patient and his wife.  On reevaluation he again has no chest pain or abdominal pain.  He is breathing without difficulty and his pain is controlled.  He is comfortable with being admitted to the hospital at this time.     I have reviewed the nursing notes.    I have reviewed the findings, diagnosis, plan and need for follow up with the patient.          Medication List      There are no discharge medications for this visit.         Final diagnoses:   Closed fracture of both calcanei, initial encounter   Fall, initial encounter       12/16/2018   [facility] EMERGENCY DEPARTMENT     Raza Knight  MD Carlos Eduardo  12/18/18 [id number]

## 2018-12-17 NOTE — PLAN OF CARE
"[facility] [place] ADMISSION NOTE    Patient admitted to room 2402 at approximately 2300 via cart from emergency room. Patient was accompanied by transport tech.     Verbal SBAR report received from Jose Chase prior to patient arrival.     Patient trasferred to bed via air nasir. Patient alert and oriented X 3. Pain is controlled with current analgesics.  Medication(s) being used: narcotic analgesics including hydromorphone (Dilaudid).  . Admission vital signs: Blood pressure 100/68, pulse 61, temperature 96.7  F (35.9  C), temperature source Oral, resp. rate 18, height 1.727 m (5' 8\"), weight 65.7 kg (144 lb 13.5 oz), SpO2 98 %. Patient was oriented to plan of care, call light, bed controls, tv, telephone, bathroom and visiting hours.     Risk Assessment    The following safety risks were identified during admission: fall. Yellow risk band applied: YES.     Skin Initial Assessment    This writer admitted this patient and completed a full skin assessment and Toni score in the Adult PCS flowsheet. Appropriate interventions initiated as needed.     Secondary skin check completed by Leti MILLAN RN    Pt has scab on left wrist rest of skin intact.              Neeru Givens         "

## 2018-12-17 NOTE — H&P (VIEW-ONLY)
[facility]    History and Physical  Hospital Medicine       Date of Admission:  12/16/2018  Date of Service: 12/17/2018     CC: Fall, bilateral foot pain    Assessment & Plan   Mario David is a 53 year old male with a past medical history significant for tobacco abuse who presents on 12/16/2018 with bilateral calcaneal fractures after mechanical fall.      Closed bilateral calcaneal fractures  Occurred after mechanical fall, landing square on his feet. X-ray of feet and ankle show bilateral displaced, comminuted, intraarticular fractures of bilateral calcanei. CT of feet with comminuted bilateral calcaneus fractures. Case discussed between emergency department and ortho / podiatry, podiatry to see.  - Ortho consult  - Pain control - prn dilaudid & oxycodone available  - OK to eat, no plans for surgery today - regular diet  - Hold prior to admission aspirin      Fall  Mechanical fall off of a camper roof. Ho head trauma, syncope, or loss of consciousness. Due to nature of fall, imaging of back was performed - CT of lumbar and thoracic spine without any identified fractures, but there was some degenerative disc disease, osteophytes, stenosis, and disc protrusion (see full CT report for details). Patient denies any current or recent back pain.  - No further work-up required at this time      Tobacco abuse  Encouraged cessation. Nicotine patch ordered.      Fluids: NS @ 100 ml/hr  Electrolytes: Monitor  Nutrition: Regular diet    DVT Prophylaxis: Enoxaparin (Lovenox) SQ  Code Status: Full Code - discussed directly with patient    Lines: Peripheral  Farmer catheter: Not indicated    Disposition: Anticipate discharge in 2+ day(s). Appropriate for inpatient care.    Discussion: Assessment & plan discussed with Dr. Brennan Malik.    Johana Cruz PA-C  [facility] Hospitalist Service  Pager: [phone]          Primary Care Physician   No Ref-Primary, Physician None    History is obtained  from the patient and review of old records via the EMR.    Past Medical History      Past Medical History:   Diagnosis Date     Tobacco dependence      Patient Active Problem List    Diagnosis Date Noted     Tobacco abuse 06/22/2011     Priority: Medium        Past Surgical History     Past Surgical History:   Procedure Laterality Date     NO HISTORY OF SURGERY          History of Present Illness   Mario David is a 53 year old male with the above past medical history now presents on 12/16/2018 with foot pain after mechanical fall.    Patient was up on the roof of his camper clearing leaves and ice off of it when he lost his balance and fell onto the ground, landing square on both feet. He felt instant pain in both feet, has pain with ambulation and weight bearing. His feet are very painful, swollen, and bruised.     He denies any back pain (current or recent), no syncope, loss of consciousness, or head trauma related to his fall.    On review of systems he denies recent fever or chills, chest pain, palpitations, cough, wheeze, shortness of breath, abdominal pain, nausea, vomiting, diarrhea, constipation, melena, hematochezia, dysuria, skin changes or rash, pain other than foot pain, headache, vision changes, syncope, dizziness, numbness, tingling, focal weakness, confusion, or slurred speech.      Prior to Admission Medications   Prior to Admission Medications   Prescriptions Last Dose Informant Patient Reported? Taking?   aspirin 81 MG tablet More than a month at Unknown time Self No No   Sig: Take 1 tablet (81 mg) by mouth daily      Facility-Administered Medications: None     Allergies   No Known Allergies    Family History    Family History   Problem Relation Age of Onset     No Known Problems Mother      No Known Problems Father        Social History   Social History     Socioeconomic History     Marital status:      Spouse name: Not on file     Number of children: Not on file     Years of education: Not  "on file     Highest education level: Not on file   Social Needs     Financial resource strain: Not on file     Food insecurity - worry: Not on file     Food insecurity - inability: Not on file     Transportation needs - medical: Not on file     Transportation needs - non-medical: Not on file   Occupational History     Not on file   Tobacco Use     Smoking status: Current Every Day Smoker     Packs/day: 1.00     Years: 30.00     Pack years: 30.00     Types: Cigarettes     Smokeless tobacco: Never Used   Substance and Sexual Activity     Alcohol use: Yes     Comment: 2 drinks on the weekend     Drug use: No     Sexual activity: Yes   Other Topics Concern     Parent/sibling w/ CABG, MI or angioplasty before 65F 55M? No   Social History Narrative     Not on file       Review of Systems     A complete 10 point review of systems was negative except for items noted in the HPI/subjective.    Physical Exam   /72 (BP Location: Left arm)   Pulse 69   Temp 98.1  F (36.7  C) (Oral)   Resp 16   Ht 1.727 m (5' 8\")   Wt 66.9 kg (147 lb 7.8 oz)   SpO2 98%   BMI 22.43 kg/m       Weight: 147 lbs 7.8 oz Body mass index is 22.43 kg/m .     Constitutional: Alert, oriented, cooperative, no apparent distress, appears nontoxic, speaking in full sentences.     Eyes: Eyes are clear, pupils are reactive. No scleral icterus. Extroccular movements intact.     HEENT: Oropharynx is clear and moist, no lesions. Normocephalic, no evidence of cranial trauma.      Cardiovascular: Regular rhythm and rate, normal S1 and S2. No murmur, rubs, or gallops. Peripheral pulses in tact bilaterally. No lower extremity edema.    Respiratory: Lung sounds are clear to auscultation bilaterally without wheezes, rhonchi, or crackles.    GI: Soft, non-distended. Non-tender, no rebound or guarding. No hepatosplenomegaly or masses appreciated. Normal bowel sounds.     Musculoskeletal: Bilateral ankles edematous with ecchymoses (right > left). Painful " palpation and attempted range of motion of ankles. No tenderness with palpation of spine. Otherwise without obvious deformity, normal range of motion. Normal muscle bulk and tone.     Skin: Warm and dry, no rashes. Ecchymoses of bilateral ankles. No mottling of skin.      Neurologic: Patient moves all extremities. Cranial nerves are grossly intact.  is symmetric. Gross strength and sensation are equal bilaterally (dorsiflexion / plantar flexion not assessed due to injury).    Genitourinary: Deferred    Data   Data reviewed today:   Recent Labs   Lab 12/16/18  1836   WBC 10.7   HGB 14.4   MCV 97         POTASSIUM 4.0   CHLORIDE 104   CO2 26   BUN 12   CR 0.75   ANIONGAP 7   LAURIE 8.5   *       Recent Results (from the past 24 hour(s))   XR Foot Bilateral G/E 3 Views    Narrative    BILATERAL ANKLE THREE VIEWS, BILATERAL FOOT THREE VIEWS 12/16/2018  5:56 PM     HISTORY: Fall. Bilateral foot and ankle pain.    COMPARISON: None.      Impression    IMPRESSION: There are displaced, comminuted, intra-articular fractures  of the bilateral calcanei. The fracture line within the right  calcaneus extends into the talocalcaneal joint. The fracture lines in  the left calcaneus extend into both the talocalcaneal and  calcaneocuboid joints. Bilateral hallux valgus is noted. No evidence  for acute fracture or dislocation involving the bilateral ankles. The  mortises are intact.    RAHUL RIVAS MD   XR Ankle Bilateral G/E 3 Views    Narrative    BILATERAL ANKLE THREE VIEWS, BILATERAL FOOT THREE VIEWS 12/16/2018  5:56 PM     HISTORY: Fall. Bilateral foot and ankle pain.    COMPARISON: None.      Impression    IMPRESSION: There are displaced, comminuted, intra-articular fractures  of the bilateral calcanei. The fracture line within the right  calcaneus extends into the talocalcaneal joint. The fracture lines in  the left calcaneus extend into both the talocalcaneal and  calcaneocuboid joints. Bilateral hallux  valgus is noted. No evidence  for acute fracture or dislocation involving the bilateral ankles. The  mortises are intact.    RAHUL RIVAS MD   Lumbar spine CT w/o contrast    Narrative    CT LUMBAR SPINE WITHOUT CONTRAST 12/16/2018 7:35 PM     HISTORY: T/L-spine trauma, minor-moderate, low back pain.    TECHNIQUE: Axial images were obtained through the thoracic spine  without contrast. Coronal and sagittal reconstructions were also  acquired. Radiation dose for this scan was reduced using automated  exposure control, adjustment of the mA and/or kV according to patient  size, or iterative reconstruction technique.    FINDINGS: Sagittal images demonstrate normal posterior alignment.  There is no evidence for fracture. Five lumbar type vertebral bodies  are present.    T12-L1: Normal.    L1-2: Normal.    L2-3: Normal.    L3-4: Minimal disc bulging with minimal facet and ligament flavum  hypertrophy is present causing mild central canal stenosis but no  neural foraminal stenosis.    L4-5: Broad-based disc bulging slightly asymmetric to the right is  present along with mild facet and ligament flavum hypertrophy. There  is mild central canal stenosis and mild to moderate bilateral neural  foraminal stenosis.    L5-S1: There is a vacuum disc phenomenon with broad-based disc bulging  and a right paramedian to posterior lateral osteophyte and/or disc  protrusion. The findings are causing some moderate right lateral  recess stenosis and moderate right-sided foraminal stenosis. There is  also mild left-sided foraminal stenosis but no central canal stenosis.    Paraspinal soft tissues are remarkable for some vascular  calcifications.      Impression    IMPRESSION:  1. No evidence for fracture or any posterior malalignment.  2. Right paramedian to posterior lateral disc osteophyte complex at  L5-S1 with moderate right lateral recess stenosis and moderate  right-sided foraminal stenosis.  3. L4-5 degenerative disc and facet  disease with mild central mild to  moderate bilateral neural foraminal stenosis.    SANFORD STERLING MD   CT Thoracic Spine w/o Contrast    Narrative    CT THORACIC SPINE WITHOUT CONTRAST 12/16/2018 7:36 PM     HISTORY: T/L-spine trauma, minor-moderate, low back pain.    TECHNIQUE: Axial images were obtained through the thoracic spine  without contrast. Coronal and sagittal reconstructions were also  acquired. Radiation dose for this scan was reduced using automated  exposure control, adjustment of the mA and/or kV according to patient  size, or iterative reconstruction technique.    FINDINGS: Sagittal images demonstrate normal posterior alignment.  There is no evidence for fracture. Mild to moderate multilevel  degenerative disc and facet disease is present throughout the thoracic  spine most advanced at the T9-10 level where there is some mild to  moderate central canal stenosis and minimal cord deformity mainly due  to ossification of the left ligamentum flavum. Paraspinal soft tissues  are unremarkable as visualized. There is also a moderate-sized left  paramedian disc protrusion at C6-7 causing some mild to moderate  central canal stenosis.      Impression    IMPRESSION:  1. No evidence for fracture or malalignment in the thoracic spine.  2. Mild to moderate degenerative disc disease throughout the thoracic  spine most advanced at T9-10 where there is mild to moderate central  canal stenosis.  3. Moderate size left paramedian disc protrusion noted at C6-7 with  mild to moderate central canal stenosis.    SANFORD STERLING MD   CT Foot Bilateral w/o Contrast    Narrative    CT FOOT BILATERAL WITHOUT CONTRAST December 16, 2018 7:36 PM    HISTORY: Trauma. Evaluate calcaneal fractures.    TECHNIQUE: Helical axial scans with sagittal and coronal  reconstructions. Radiation dose for this scan was reduced using  automated exposure control, adjustment of the mA and/or kV according  to patient size, or iterative reconstruction  technique.    COMPARISON: None.    FINDINGS:     Right foot: Comminuted fracture of the calcaneus. Intra-articular  extension into the posterior subtalar joint at a single location with  up to 7 mm distraction in the central aspect of the posterior facet  (Reeves classification type II B). Two fracture lines show  intra-articular extension at the calcaneocuboid joint (image 25 of  series 4). No other bones are fractured in the hindfoot, midfoot or  visualized forefoot.    Left foot: Comminuted fracture of the calcaneus. Intra-articular  extension at the posterior subtalar joint at a single location  coursing through the lateral aspect of the posterior facet with up to  4 mm plantar displacement of the lateral fracture fragment (Reeves  classification type II A). Additional fracture lines extending  anteriorly show two foci of intra-articular extension at the  calcaneocuboid joint. No other bones are fractured in the hindfoot,  midfoot or visualized forefoot.      Impression    IMPRESSION:  1. Comminuted fracture right calcaneus (Reeves classification type II  B).  2. Comminuted fracture left calcaneus (Reeves classification type II  A).  3. Pulmonary report given by Dr. Garcia.       I personally reviewed the bilateral foot x-ray image(s) showing bilateral calcaneous fractures     Johana Cruz PA-C  [facility] Hospitalist [facility]  Pager: [phone]

## 2018-12-17 NOTE — CONSULTS
[facility] Consultation    Consultation - [facility]  Level of consult: Consult, follow and place orders    Mario David,  1965, MRN [id number]     Admitting Dx: Fall, initial encounter [W19.XXXA]  Closed fracture of both calcanei, initial encounter [S92.001A, S92.002A]     PCP: No Ref-Primary, Physician, None     Code status:  Full Code     Extended Emergency Contact Information  Primary Emergency Contact: Velma Sullivan  Address: [address] [place] [id number] [facility]  Home Phone: [phone]  Mobile Phone: [phone]  Relation: Spouse     Assessment:    1.  Bilateral calcaneal articular displaced fractures - Reeves Type II       Plan:  Reviewed condition, etiology, imaging, involvement, timing and treatment options in terms of non-operative vs. operative care options along with implications therein.  Recommended allowing tissues to calm and swelling to improve and monitor for fracture blistering with clinical follow-up in the next 7-10 days for recheck and coordination of potential operative care given the associated pros, cons, risks and benefits.    Activity: NWB Bilaterally in posterior molded splints  Assistive Devices: WC, up with assistance, transfer board for transfers.  Bilateral air select cast boots recommended.  Pain management: IV to PO for home going  Surgical planning: would anticipate 1-2 weeks out to allow for improved swelling and to manage tissues monitor for fracture blistering.    He will remain in touch with our clinic.    Case and care reviewed today with bedside nurse and will be coordinated with hospitalist.    Please call or page with questions.    Thanks for allowing me to be involved in the care of this patient.    Principal Problem:    Closed bilateral calcaneal fractures  Active Problems:    Tobacco abuse       Chief Complaint  Bilateral calcaneal fractures     HPI  We have been requested by ED physician and hospital physician  to evaluate Mario David who is a 53 year old year old male for bilateral heel/calcaneal fractures.  Patient reports that he was cleaning off his camper with a leaf blower took a misstep and landed squarely on both heels.  He could not walk and was brought to the ER.  X-rays were taken revealed bilateral calcaneal fractures.  A CT scan conducted to further evaluate these.  He was admitted to make certain no additional injuries were found then to have pain control as well as physical therapy.  Patient has had pain while inpatient but this is been reasonably managed via IV and orals.  He has been nonweightbearing.  He has noticed significant increases in his visible swelling and ecchymosis.  No other concerns have developed while he has been in inpatient.     History is obtained from the patient     Past Medical History  Past Medical History:   Diagnosis Date     Tobacco dependence        Surgical History  Past Surgical History:   Procedure Laterality Date     NO HISTORY OF SURGERY          Social History  Social History     Socioeconomic History     Marital status:      Spouse name: Not on file     Number of children: Not on file     Years of education: Not on file     Highest education level: Not on file   Social Needs     Financial resource strain: Not on file     Food insecurity - worry: Not on file     Food insecurity - inability: Not on file     Transportation needs - medical: Not on file     Transportation needs - non-medical: Not on file   Occupational History     Not on file   Tobacco Use     Smoking status: Current Every Day Smoker     Packs/day: 1.00     Years: 30.00     Pack years: 30.00     Types: Cigarettes     Smokeless tobacco: Never Used   Substance and Sexual Activity     Alcohol use: Yes     Comment: 2 drinks on the weekend     Drug use: No     Sexual activity: Yes   Other Topics Concern     Parent/sibling w/ CABG, MI or angioplasty before 65F 55M? No   Social History Narrative     Not on file        Family History  Family History   Problem Relation Age of Onset     No Known Problems Mother      No Known Problems Father         Allergies:  Patient has no known allergies.      Current Medications:  Current Facility-Administered Medications   Medication     acetaminophen (TYLENOL) tablet 975 mg     enoxaparin (LOVENOX) injection 40 mg     HYDROmorphone (PF) (DILAUDID) injection 0.3-0.5 mg     melatonin tablet 1 mg     naloxone (NARCAN) injection 0.1-0.4 mg     nicotine (NICODERM CQ) 14 MG/24HR 24 hr patch 1 patch     nicotine Patch in Place     [START ON 12/18/2018] nicotine patch REMOVAL     ondansetron (ZOFRAN-ODT) ODT tab 4 mg    Or     ondansetron (ZOFRAN) injection 4 mg     oxyCODONE (ROXICODONE) tablet 5-10 mg     Patient is already receiving anticoagulation with heparin, enoxaparin (LOVENOX), warfarin (COUMADIN)  or other anticoagulant medication     polyethylene glycol (MIRALAX/GLYCOLAX) Packet 17 g     prochlorperazine (COMPAZINE) injection 10 mg    Or     prochlorperazine (COMPAZINE) tablet 10 mg    Or     prochlorperazine (COMPAZINE) Suppository 25 mg     senna-docusate (SENOKOT-S/PERICOLACE) 8.6-50 MG per tablet 1 tablet    Or     senna-docusate (SENOKOT-S/PERICOLACE) 8.6-50 MG per tablet 2 tablet     sodium chloride 0.9% infusion       Review of Systems:  A comprehensive review of systems was performed and found to be negative except as described in this note    Physical Exam:  Temp:  [96.7  F (35.9  C)-98.3  F (36.8  C)] 97.9  F (36.6  C)  Pulse:  [52-78] 78  Resp:  [12-18] 12  BP: ()/(54-73) 111/72  SpO2:  [94 %-99 %] 97 %    General: Alert oriented and interactive  Vascular: Vascular pulses secondary to post trauma edema and ecchymosis.  Normal capillary filling time  Neurological: Intact to distal light touch sensation.  Proprioception intact.  Derm: No open lesions nor active fracture blistering.  Significant ecchymosis about the inferior lateral margins of the known underlying  calcaneal fractures.  MSK: Bilateral palpable pain about the periphery of calcaneal margins with palpation and inspection.  Significant guarding appreciated at this acute postoperative interval.       Pertinent Labs  Lab Results: personally reviewed.  Lab Results   Component Value Date    WBC 10.7 12/16/2018    HGB 14.4 12/16/2018    HCT 43.0 12/16/2018    MCV 97 12/16/2018     12/16/2018     No results for input(s): INR in the last 168 hours.    Pertinent Radiology  Radiology Results: images and radiology report reviewed  Recent Results (from the past 24 hour(s))   XR Foot Bilateral G/E 3 Views    Narrative    BILATERAL ANKLE THREE VIEWS, BILATERAL FOOT THREE VIEWS 12/16/2018  5:56 PM     HISTORY: Fall. Bilateral foot and ankle pain.    COMPARISON: None.      Impression    IMPRESSION: There are displaced, comminuted, intra-articular fractures  of the bilateral calcanei. The fracture line within the right  calcaneus extends into the talocalcaneal joint. The fracture lines in  the left calcaneus extend into both the talocalcaneal and  calcaneocuboid joints. Bilateral hallux valgus is noted. No evidence  for acute fracture or dislocation involving the bilateral ankles. The  mortises are intact.    RAHUL RIVAS MD   XR Ankle Bilateral G/E 3 Views    Narrative    BILATERAL ANKLE THREE VIEWS, BILATERAL FOOT THREE VIEWS 12/16/2018  5:56 PM     HISTORY: Fall. Bilateral foot and ankle pain.    COMPARISON: None.      Impression    IMPRESSION: There are displaced, comminuted, intra-articular fractures  of the bilateral calcanei. The fracture line within the right  calcaneus extends into the talocalcaneal joint. The fracture lines in  the left calcaneus extend into both the talocalcaneal and  calcaneocuboid joints. Bilateral hallux valgus is noted. No evidence  for acute fracture or dislocation involving the bilateral ankles. The  mortises are intact.    RAHUL RIVAS MD   Lumbar spine CT w/o contrast     Narrative    CT LUMBAR SPINE WITHOUT CONTRAST 12/16/2018 7:35 PM     HISTORY: T/L-spine trauma, minor-moderate, low back pain.    TECHNIQUE: Axial images were obtained through the thoracic spine  without contrast. Coronal and sagittal reconstructions were also  acquired. Radiation dose for this scan was reduced using automated  exposure control, adjustment of the mA and/or kV according to patient  size, or iterative reconstruction technique.    FINDINGS: Sagittal images demonstrate normal posterior alignment.  There is no evidence for fracture. Five lumbar type vertebral bodies  are present.    T12-L1: Normal.    L1-2: Normal.    L2-3: Normal.    L3-4: Minimal disc bulging with minimal facet and ligament flavum  hypertrophy is present causing mild central canal stenosis but no  neural foraminal stenosis.    L4-5: Broad-based disc bulging slightly asymmetric to the right is  present along with mild facet and ligament flavum hypertrophy. There  is mild central canal stenosis and mild to moderate bilateral neural  foraminal stenosis.    L5-S1: There is a vacuum disc phenomenon with broad-based disc bulging  and a right paramedian to posterior lateral osteophyte and/or disc  protrusion. The findings are causing some moderate right lateral  recess stenosis and moderate right-sided foraminal stenosis. There is  also mild left-sided foraminal stenosis but no central canal stenosis.    Paraspinal soft tissues are remarkable for some vascular  calcifications.      Impression    IMPRESSION:  1. No evidence for fracture or any posterior malalignment.  2. Right paramedian to posterior lateral disc osteophyte complex at  L5-S1 with moderate right lateral recess stenosis and moderate  right-sided foraminal stenosis.  3. L4-5 degenerative disc and facet disease with mild central mild to  moderate bilateral neural foraminal stenosis.    SANFORD STERLING MD   CT Thoracic Spine w/o Contrast    Narrative    CT THORACIC SPINE WITHOUT  CONTRAST 12/16/2018 7:36 PM     HISTORY: T/L-spine trauma, minor-moderate, low back pain.    TECHNIQUE: Axial images were obtained through the thoracic spine  without contrast. Coronal and sagittal reconstructions were also  acquired. Radiation dose for this scan was reduced using automated  exposure control, adjustment of the mA and/or kV according to patient  size, or iterative reconstruction technique.    FINDINGS: Sagittal images demonstrate normal posterior alignment.  There is no evidence for fracture. Mild to moderate multilevel  degenerative disc and facet disease is present throughout the thoracic  spine most advanced at the T9-10 level where there is some mild to  moderate central canal stenosis and minimal cord deformity mainly due  to ossification of the left ligamentum flavum. Paraspinal soft tissues  are unremarkable as visualized. There is also a moderate-sized left  paramedian disc protrusion at C6-7 causing some mild to moderate  central canal stenosis.      Impression    IMPRESSION:  1. No evidence for fracture or malalignment in the thoracic spine.  2. Mild to moderate degenerative disc disease throughout the thoracic  spine most advanced at T9-10 where there is mild to moderate central  canal stenosis.  3. Moderate size left paramedian disc protrusion noted at C6-7 with  mild to moderate central canal stenosis.    SANFORD STERLING MD   CT Foot Bilateral w/o Contrast    Narrative    CT FOOT BILATERAL WITHOUT CONTRAST December 16, 2018 7:36 PM    HISTORY: Trauma. Evaluate calcaneal fractures.    TECHNIQUE: Helical axial scans with sagittal and coronal  reconstructions. Radiation dose for this scan was reduced using  automated exposure control, adjustment of the mA and/or kV according  to patient size, or iterative reconstruction technique.    COMPARISON: None.    FINDINGS:     Right foot: Comminuted fracture of the calcaneus. Intra-articular  extension into the posterior subtalar joint at a single  location with  up to 7 mm distraction in the central aspect of the posterior facet  (Reeves classification type II B). Two fracture lines show  intra-articular extension at the calcaneocuboid joint (image 25 of  series 4). No other bones are fractured in the hindfoot, midfoot or  visualized forefoot.    Left foot: Comminuted fracture of the calcaneus. Intra-articular  extension at the posterior subtalar joint at a single location  coursing through the lateral aspect of the posterior facet with up to  4 mm plantar displacement of the lateral fracture fragment (Reeves  classification type II A). Additional fracture lines extending  anteriorly show two foci of intra-articular extension at the  calcaneocuboid joint. No other bones are fractured in the hindfoot,  midfoot or visualized forefoot.      Impression    IMPRESSION:  1. Comminuted fracture right calcaneus (Reeves classification type II  B).  2. Comminuted fracture left calcaneus (Reeves classification type II  A).  3. Pulmonary report given by Dr. Garcia.       Nicolas Pacheco DPM, [facility] Medical CenterFAS  Foot & Ankle Surgeon/Specialist  [facility]

## 2018-12-17 NOTE — H&P
[facility]    History and Physical  Hospital Medicine       Date of Admission:  12/16/2018  Date of Service: 12/17/2018     CC: Fall, bilateral foot pain    Assessment & Plan   Mario David is a 53 year old male with a past medical history significant for tobacco abuse who presents on 12/16/2018 with bilateral calcaneal fractures after mechanical fall.      Closed bilateral calcaneal fractures  Occurred after mechanical fall, landing square on his feet. X-ray of feet and ankle show bilateral displaced, comminuted, intraarticular fractures of bilateral calcanei. CT of feet with comminuted bilateral calcaneus fractures. Case discussed between emergency department and ortho / podiatry, podiatry to see.  - Ortho consult  - Pain control - prn dilaudid & oxycodone available  - OK to eat, no plans for surgery today - regular diet  - Hold prior to admission aspirin      Fall  Mechanical fall off of a camper roof. Ho head trauma, syncope, or loss of consciousness. Due to nature of fall, imaging of back was performed - CT of lumbar and thoracic spine without any identified fractures, but there was some degenerative disc disease, osteophytes, stenosis, and disc protrusion (see full CT report for details). Patient denies any current or recent back pain.  - No further work-up required at this time      Tobacco abuse  Encouraged cessation. Nicotine patch ordered.      Fluids: NS @ 100 ml/hr  Electrolytes: Monitor  Nutrition: Regular diet    DVT Prophylaxis: Enoxaparin (Lovenox) SQ  Code Status: Full Code - discussed directly with patient    Lines: Peripheral  Farmer catheter: Not indicated    Disposition: Anticipate discharge in 2+ day(s). Appropriate for inpatient care.    Discussion: Assessment & plan discussed with Dr. Brennan Malik.    Johana Cruz PA-C  [facility] Hospitalist Service  Pager: [phone]          Primary Care Physician   No Ref-Primary, Physician None    History is obtained  from the patient and review of old records via the EMR.    Past Medical History      Past Medical History:   Diagnosis Date     Tobacco dependence      Patient Active Problem List    Diagnosis Date Noted     Tobacco abuse 06/22/2011     Priority: Medium        Past Surgical History     Past Surgical History:   Procedure Laterality Date     NO HISTORY OF SURGERY          History of Present Illness   Mario David is a 53 year old male with the above past medical history now presents on 12/16/2018 with foot pain after mechanical fall.    Patient was up on the roof of his camper clearing leaves and ice off of it when he lost his balance and fell onto the ground, landing square on both feet. He felt instant pain in both feet, has pain with ambulation and weight bearing. His feet are very painful, swollen, and bruised.     He denies any back pain (current or recent), no syncope, loss of consciousness, or head trauma related to his fall.    On review of systems he denies recent fever or chills, chest pain, palpitations, cough, wheeze, shortness of breath, abdominal pain, nausea, vomiting, diarrhea, constipation, melena, hematochezia, dysuria, skin changes or rash, pain other than foot pain, headache, vision changes, syncope, dizziness, numbness, tingling, focal weakness, confusion, or slurred speech.      Prior to Admission Medications   Prior to Admission Medications   Prescriptions Last Dose Informant Patient Reported? Taking?   aspirin 81 MG tablet More than a month at Unknown time Self No No   Sig: Take 1 tablet (81 mg) by mouth daily      Facility-Administered Medications: None     Allergies   No Known Allergies    Family History    Family History   Problem Relation Age of Onset     No Known Problems Mother      No Known Problems Father        Social History   Social History     Socioeconomic History     Marital status:      Spouse name: Not on file     Number of children: Not on file     Years of education: Not  "on file     Highest education level: Not on file   Social Needs     Financial resource strain: Not on file     Food insecurity - worry: Not on file     Food insecurity - inability: Not on file     Transportation needs - medical: Not on file     Transportation needs - non-medical: Not on file   Occupational History     Not on file   Tobacco Use     Smoking status: Current Every Day Smoker     Packs/day: 1.00     Years: 30.00     Pack years: 30.00     Types: Cigarettes     Smokeless tobacco: Never Used   Substance and Sexual Activity     Alcohol use: Yes     Comment: 2 drinks on the weekend     Drug use: No     Sexual activity: Yes   Other Topics Concern     Parent/sibling w/ CABG, MI or angioplasty before 65F 55M? No   Social History Narrative     Not on file       Review of Systems     A complete 10 point review of systems was negative except for items noted in the HPI/subjective.    Physical Exam   /72 (BP Location: Left arm)   Pulse 69   Temp 98.1  F (36.7  C) (Oral)   Resp 16   Ht 1.727 m (5' 8\")   Wt 66.9 kg (147 lb 7.8 oz)   SpO2 98%   BMI 22.43 kg/m       Weight: 147 lbs 7.8 oz Body mass index is 22.43 kg/m .     Constitutional: Alert, oriented, cooperative, no apparent distress, appears nontoxic, speaking in full sentences.     Eyes: Eyes are clear, pupils are reactive. No scleral icterus. Extroccular movements intact.     HEENT: Oropharynx is clear and moist, no lesions. Normocephalic, no evidence of cranial trauma.      Cardiovascular: Regular rhythm and rate, normal S1 and S2. No murmur, rubs, or gallops. Peripheral pulses in tact bilaterally. No lower extremity edema.    Respiratory: Lung sounds are clear to auscultation bilaterally without wheezes, rhonchi, or crackles.    GI: Soft, non-distended. Non-tender, no rebound or guarding. No hepatosplenomegaly or masses appreciated. Normal bowel sounds.     Musculoskeletal: Bilateral ankles edematous with ecchymoses (right > left). Painful " palpation and attempted range of motion of ankles. No tenderness with palpation of spine. Otherwise without obvious deformity, normal range of motion. Normal muscle bulk and tone.     Skin: Warm and dry, no rashes. Ecchymoses of bilateral ankles. No mottling of skin.      Neurologic: Patient moves all extremities. Cranial nerves are grossly intact.  is symmetric. Gross strength and sensation are equal bilaterally (dorsiflexion / plantar flexion not assessed due to injury).    Genitourinary: Deferred    Data   Data reviewed today:   Recent Labs   Lab 12/16/18  1836   WBC 10.7   HGB 14.4   MCV 97         POTASSIUM 4.0   CHLORIDE 104   CO2 26   BUN 12   CR 0.75   ANIONGAP 7   LAURIE 8.5   *       Recent Results (from the past 24 hour(s))   XR Foot Bilateral G/E 3 Views    Narrative    BILATERAL ANKLE THREE VIEWS, BILATERAL FOOT THREE VIEWS 12/16/2018  5:56 PM     HISTORY: Fall. Bilateral foot and ankle pain.    COMPARISON: None.      Impression    IMPRESSION: There are displaced, comminuted, intra-articular fractures  of the bilateral calcanei. The fracture line within the right  calcaneus extends into the talocalcaneal joint. The fracture lines in  the left calcaneus extend into both the talocalcaneal and  calcaneocuboid joints. Bilateral hallux valgus is noted. No evidence  for acute fracture or dislocation involving the bilateral ankles. The  mortises are intact.    RAHUL RIVAS MD   XR Ankle Bilateral G/E 3 Views    Narrative    BILATERAL ANKLE THREE VIEWS, BILATERAL FOOT THREE VIEWS 12/16/2018  5:56 PM     HISTORY: Fall. Bilateral foot and ankle pain.    COMPARISON: None.      Impression    IMPRESSION: There are displaced, comminuted, intra-articular fractures  of the bilateral calcanei. The fracture line within the right  calcaneus extends into the talocalcaneal joint. The fracture lines in  the left calcaneus extend into both the talocalcaneal and  calcaneocuboid joints. Bilateral hallux  valgus is noted. No evidence  for acute fracture or dislocation involving the bilateral ankles. The  mortises are intact.    RAHUL RIVAS MD   Lumbar spine CT w/o contrast    Narrative    CT LUMBAR SPINE WITHOUT CONTRAST 12/16/2018 7:35 PM     HISTORY: T/L-spine trauma, minor-moderate, low back pain.    TECHNIQUE: Axial images were obtained through the thoracic spine  without contrast. Coronal and sagittal reconstructions were also  acquired. Radiation dose for this scan was reduced using automated  exposure control, adjustment of the mA and/or kV according to patient  size, or iterative reconstruction technique.    FINDINGS: Sagittal images demonstrate normal posterior alignment.  There is no evidence for fracture. Five lumbar type vertebral bodies  are present.    T12-L1: Normal.    L1-2: Normal.    L2-3: Normal.    L3-4: Minimal disc bulging with minimal facet and ligament flavum  hypertrophy is present causing mild central canal stenosis but no  neural foraminal stenosis.    L4-5: Broad-based disc bulging slightly asymmetric to the right is  present along with mild facet and ligament flavum hypertrophy. There  is mild central canal stenosis and mild to moderate bilateral neural  foraminal stenosis.    L5-S1: There is a vacuum disc phenomenon with broad-based disc bulging  and a right paramedian to posterior lateral osteophyte and/or disc  protrusion. The findings are causing some moderate right lateral  recess stenosis and moderate right-sided foraminal stenosis. There is  also mild left-sided foraminal stenosis but no central canal stenosis.    Paraspinal soft tissues are remarkable for some vascular  calcifications.      Impression    IMPRESSION:  1. No evidence for fracture or any posterior malalignment.  2. Right paramedian to posterior lateral disc osteophyte complex at  L5-S1 with moderate right lateral recess stenosis and moderate  right-sided foraminal stenosis.  3. L4-5 degenerative disc and facet  disease with mild central mild to  moderate bilateral neural foraminal stenosis.    SANFORD STERLING MD   CT Thoracic Spine w/o Contrast    Narrative    CT THORACIC SPINE WITHOUT CONTRAST 12/16/2018 7:36 PM     HISTORY: T/L-spine trauma, minor-moderate, low back pain.    TECHNIQUE: Axial images were obtained through the thoracic spine  without contrast. Coronal and sagittal reconstructions were also  acquired. Radiation dose for this scan was reduced using automated  exposure control, adjustment of the mA and/or kV according to patient  size, or iterative reconstruction technique.    FINDINGS: Sagittal images demonstrate normal posterior alignment.  There is no evidence for fracture. Mild to moderate multilevel  degenerative disc and facet disease is present throughout the thoracic  spine most advanced at the T9-10 level where there is some mild to  moderate central canal stenosis and minimal cord deformity mainly due  to ossification of the left ligamentum flavum. Paraspinal soft tissues  are unremarkable as visualized. There is also a moderate-sized left  paramedian disc protrusion at C6-7 causing some mild to moderate  central canal stenosis.      Impression    IMPRESSION:  1. No evidence for fracture or malalignment in the thoracic spine.  2. Mild to moderate degenerative disc disease throughout the thoracic  spine most advanced at T9-10 where there is mild to moderate central  canal stenosis.  3. Moderate size left paramedian disc protrusion noted at C6-7 with  mild to moderate central canal stenosis.    SANFORD STERLING MD   CT Foot Bilateral w/o Contrast    Narrative    CT FOOT BILATERAL WITHOUT CONTRAST December 16, 2018 7:36 PM    HISTORY: Trauma. Evaluate calcaneal fractures.    TECHNIQUE: Helical axial scans with sagittal and coronal  reconstructions. Radiation dose for this scan was reduced using  automated exposure control, adjustment of the mA and/or kV according  to patient size, or iterative reconstruction  technique.    COMPARISON: None.    FINDINGS:     Right foot: Comminuted fracture of the calcaneus. Intra-articular  extension into the posterior subtalar joint at a single location with  up to 7 mm distraction in the central aspect of the posterior facet  (Reeves classification type II B). Two fracture lines show  intra-articular extension at the calcaneocuboid joint (image 25 of  series 4). No other bones are fractured in the hindfoot, midfoot or  visualized forefoot.    Left foot: Comminuted fracture of the calcaneus. Intra-articular  extension at the posterior subtalar joint at a single location  coursing through the lateral aspect of the posterior facet with up to  4 mm plantar displacement of the lateral fracture fragment (Reeves  classification type II A). Additional fracture lines extending  anteriorly show two foci of intra-articular extension at the  calcaneocuboid joint. No other bones are fractured in the hindfoot,  midfoot or visualized forefoot.      Impression    IMPRESSION:  1. Comminuted fracture right calcaneus (Reeves classification type II  B).  2. Comminuted fracture left calcaneus (Reeves classification type II  A).  3. Pulmonary report given by Dr. Garcia.       I personally reviewed the bilateral foot x-ray image(s) showing bilateral calcaneous fractures     Johana Cruz PA-C  [facility] Hospitalist Service  Pager: [phone]

## 2018-12-17 NOTE — PROGRESS NOTES
Secondary skin assessment done with admitting RN, agree with assessment and documented findings.

## 2018-12-17 NOTE — PLAN OF CARE
Right ankle more swollen than at admit, has ice on at times, declines further ice, Enc. Keeping FOB elevated, Pain fairly well controlled. Enc. Pt to call for pain med when pain increases or sustains , pt states will do this. Had small snack at 2300, NPO after.

## 2018-12-18 ENCOUNTER — APPOINTMENT (OUTPATIENT)
Dept: OCCUPATIONAL THERAPY | Facility: CLINIC | Age: 53
End: 2018-12-18
Payer: COMMERCIAL

## 2018-12-18 ENCOUNTER — APPOINTMENT (OUTPATIENT)
Dept: PHYSICAL THERAPY | Facility: CLINIC | Age: 53
End: 2018-12-18
Payer: COMMERCIAL

## 2018-12-18 VITALS
WEIGHT: 152.12 LBS | SYSTOLIC BLOOD PRESSURE: 106 MMHG | RESPIRATION RATE: 16 BRPM | HEIGHT: 68 IN | OXYGEN SATURATION: 96 % | DIASTOLIC BLOOD PRESSURE: 62 MMHG | HEART RATE: 80 BPM | TEMPERATURE: 97.4 F | BODY MASS INDEX: 23.05 KG/M2

## 2018-12-18 LAB
ANION GAP SERPL CALCULATED.3IONS-SCNC: 8 MMOL/L (ref 3–14)
BUN SERPL-MCNC: 11 MG/DL (ref 7–30)
CALCIUM SERPL-MCNC: 8.1 MG/DL (ref 8.5–10.1)
CHLORIDE SERPL-SCNC: 104 MMOL/L (ref 94–109)
CO2 SERPL-SCNC: 26 MMOL/L (ref 20–32)
CREAT SERPL-MCNC: 0.82 MG/DL (ref 0.66–1.25)
ERYTHROCYTE [DISTWIDTH] IN BLOOD BY AUTOMATED COUNT: 12.2 % (ref 10–15)
GFR SERPL CREATININE-BSD FRML MDRD: >90 ML/MIN/1.7M2
GLUCOSE SERPL-MCNC: 100 MG/DL (ref 70–99)
HCT VFR BLD AUTO: 41.4 % (ref 40–53)
HGB BLD-MCNC: 13.8 G/DL (ref 13.3–17.7)
MCH RBC QN AUTO: 32.3 PG (ref 26.5–33)
MCHC RBC AUTO-ENTMCNC: 33.3 G/DL (ref 31.5–36.5)
MCV RBC AUTO: 97 FL (ref 78–100)
PLATELET # BLD AUTO: 194 10E9/L (ref 150–450)
POTASSIUM SERPL-SCNC: 4 MMOL/L (ref 3.4–5.3)
RBC # BLD AUTO: 4.27 10E12/L (ref 4.4–5.9)
SODIUM SERPL-SCNC: 138 MMOL/L (ref 133–144)
WBC # BLD AUTO: 10.5 10E9/L (ref 4–11)

## 2018-12-18 PROCEDURE — 80048 BASIC METABOLIC PNL TOTAL CA: CPT | Performed by: PHYSICIAN ASSISTANT

## 2018-12-18 PROCEDURE — 36415 COLL VENOUS BLD VENIPUNCTURE: CPT | Performed by: PHYSICIAN ASSISTANT

## 2018-12-18 PROCEDURE — 97165 OT EVAL LOW COMPLEX 30 MIN: CPT | Mod: GO

## 2018-12-18 PROCEDURE — 97535 SELF CARE MNGMENT TRAINING: CPT | Mod: GO

## 2018-12-18 PROCEDURE — 97530 THERAPEUTIC ACTIVITIES: CPT | Mod: GP | Performed by: PHYSICAL THERAPIST

## 2018-12-18 PROCEDURE — 85027 COMPLETE CBC AUTOMATED: CPT | Performed by: PHYSICIAN ASSISTANT

## 2018-12-18 PROCEDURE — 25000132 ZZH RX MED GY IP 250 OP 250 PS 637: Performed by: PHYSICIAN ASSISTANT

## 2018-12-18 PROCEDURE — 99238 HOSP IP/OBS DSCHRG MGMT 30/<: CPT | Performed by: FAMILY MEDICINE

## 2018-12-18 PROCEDURE — 99207 ZZC CDG-CODE INCORRECT PER BILLING BASED ON TIME: CPT | Performed by: FAMILY MEDICINE

## 2018-12-18 PROCEDURE — 40000193 ZZH STATISTIC PT WARD VISIT: Performed by: PHYSICAL THERAPIST

## 2018-12-18 PROCEDURE — 97161 PT EVAL LOW COMPLEX 20 MIN: CPT | Mod: GP | Performed by: PHYSICAL THERAPIST

## 2018-12-18 PROCEDURE — 40000133 ZZH STATISTIC OT WARD VISIT

## 2018-12-18 RX ORDER — OXYCODONE HYDROCHLORIDE 5 MG/1
5-10 TABLET ORAL EVERY 6 HOURS PRN
Qty: 10 TABLET | Refills: 0 | Status: SHIPPED | OUTPATIENT
Start: 2018-12-18 | End: 2018-12-21

## 2018-12-18 RX ORDER — ACETAMINOPHEN 325 MG/1
975 TABLET ORAL 3 TIMES DAILY
Qty: 270 TABLET | Refills: 0 | Status: SHIPPED | OUTPATIENT
Start: 2018-12-18 | End: 2019-01-17

## 2018-12-18 RX ADMIN — ACETAMINOPHEN 975 MG: 325 TABLET, FILM COATED ORAL at 08:58

## 2018-12-18 RX ADMIN — OXYCODONE HYDROCHLORIDE 10 MG: 5 TABLET ORAL at 08:59

## 2018-12-18 RX ADMIN — OXYCODONE HYDROCHLORIDE 10 MG: 5 TABLET ORAL at 03:46

## 2018-12-18 RX ADMIN — ACETAMINOPHEN 975 MG: 325 TABLET, FILM COATED ORAL at 13:45

## 2018-12-18 RX ADMIN — OXYCODONE HYDROCHLORIDE 10 MG: 5 TABLET ORAL at 12:01

## 2018-12-18 RX ADMIN — SENNOSIDES AND DOCUSATE SODIUM 1 TABLET: 8.6; 5 TABLET ORAL at 08:58

## 2018-12-18 ASSESSMENT — ACTIVITIES OF DAILY LIVING (ADL)
ADLS_ACUITY_SCORE: 13

## 2018-12-18 ASSESSMENT — MIFFLIN-ST. JEOR: SCORE: 1504.5

## 2018-12-18 NOTE — PROGRESS NOTES
Mario David   IP PT Initial Evaluation  12/18/18 1300   Quick Adds   Type of Visit Initial PT Evaluation   Living Environment   Lives With child(heike), dependent;spouse   Living Arrangements house   Home Accessibility stairs within home;stairs to enter home  (1 step to get in, 6 steps inside railings on both sides)   Self-Care   Usual Activity Tolerance excellent   Current Activity Tolerance good   Equipment Currently Used at Home none   Activity/Exercise/Self-Care Comment Per Patient, Wife is currently picking up a slide board and wheel chair   Functional Level Prior   Ambulation 0-->independent   Transferring 0-->independent   Toileting 0-->independent   Bathing 0-->independent   Communication 0-->understands/communicates without difficulty   Swallowing 0-->swallows foods/liquids without difficulty   Number of times patient has fallen within last six months 1   Which of the above functional risks had a recent onset or change? ambulation;transferring;toileting   General Information   Onset of Illness/Injury or Date of Surgery - Date 12/16/18   Referring Physician Brennan Malik MD   Patient/Family Goals Statement get back home, heal up, be able to walk again   Pertinent History of Current Problem (include personal factors and/or comorbidities that impact the POC) Per H&P: Mario David is a 53 year old male with a past medical history significant for tobacco abuse who presents on 12/16/2018 with bilateral calcaneal fractures after mechanical fall. Occurred after mechanical fall, landing square on his feet. X-ray of feet and ankle show bilateral displaced, comminuted, intraarticular fractures of bilateral calcanei. CT of feet with comminuted bilateral calcaneus fractures. Case discussed between emergency department and ortho / podiatry, podiatry to see.   Precautions/Limitations fall precautions   Weight-Bearing Status - LLE nonweight-bearing   Weight-Bearing Status - RLE nonweight-bearing   Cognitive Status Examination    Orientation orientation to person, place and time   Level of Consciousness alert   Follows Commands and Answers Questions 100% of the time   Personal Safety and Judgment intact   Memory intact   Pain Assessment   Patient Currently in Pain Yes, see Vital Sign flowsheet  (report 4/ 10 pain)   Integumentary/Edema   Integumentary/Edema no deficits were identifed   Posture    Posture Forward head position   Range of Motion (ROM)   ROM Quick Adds No deficits were identified   Strength   Manual Muscle Testing Quick Adds (ankle NT due to NWB status)   Bed Mobility   Bed Mobility Bed mobility skill: Supine to sit;Bed mobility skill: Sit to supine   Bed Mobility Comments Pt is SBA with bed mobility with good adhearance to WBing precautions. Verbal cues for safety provided   Bed Mobility Skill: Sit to Supine   Level of Ramsey: Sit/Supine stand-by assist   Physical Assist/Nonphysical Assist: Sit/Supine set-up required;supervision;verbal cues   Bed Mobility Skill: Supine to Sit   Level of Ramsey: Supine/Sit stand-by assist   Physical Assist/Nonphysical Assist: Supine/Sit set-up required;supervision;verbal cues   Transfer Skills   Transfer Transfer Skill: Bed to Chair/Chair to Bed   Transfer Comments Pt SBA with slide board transfers from bed to chair and chair to bed with verbal cues for slide board use, safety, and sequencing   Transfer Skill: Bed/Chair   Level of Ramsey: Bed/Chair stand-by assist   Physical/Nonphysical Assist: Bed/Chair set-up required;supervision;verbal cues   Weightbearing Restrictions: Bed/Chair nonweight-bearing   Assistive Device: Bed/Chair other (see comments)  (slide board)   Gait   Gait Comments NWB js ankles   Balance   Balance no deficits were identified   Sensory Examination   Sensory Perception no deficits were identified   Coordination   Coordination no deficits were identified   Muscle Tone   Muscle Tone no deficits were identified   General Therapy Interventions   Planned  "Therapy Interventions transfer training;strengthening;ROM;neuromuscular re-education   Intervention Comments to address impairments above, improve overall function   Clinical Impression   Criteria for Skilled Therapeutic Intervention yes, treatment indicated   PT Diagnosis js ankle pain   Influenced by the following impairments pain, weakness, decreased ROM   Functional limitations due to impairments increased need of assist, unable to walk due to WB status   Clinical Presentation Evolving/Changing   Clinical Presentation Rationale recency, PT judgement, subjective report, objective data   Clinical Decision Making (Complexity) Low complexity   Therapy Frequency` daily   Predicted Duration of Therapy Intervention (days/wks) 3 days   Anticipated Equipment Needs at Discharge transfer board;wheelchair   Anticipated Discharge Disposition Home with Home Therapy   Risk & Benefits of therapy have been explained Yes   Patient, Family & other staff in agreement with plan of care Yes   Clinical Impression Comments Pt is a pleasant 54 y/o male presenting to PT with js ankle pain following a fall off the roof at home. Pt will benefit from skilled PT to address problems list above. Pt is a good PT candidate.    [facility] [employer] TM \"6 Clicks\"   2016, Trustees of [employer] [employer], under license to [employer].  All rights reserved.   6 Clicks Short Forms Basic Mobility Inpatient Short Form   [facility] [employer]  \"6 Clicks\" V.2 Basic Mobility Inpatient Short Form   1. Turning from your back to your side while in a flat bed without using bedrails? 4 - None   2. Moving from lying on your back to sitting on the side of a flat bed without using bedrails? 4 - None   3. Moving to and from a bed to a chair (including a wheelchair)? 3 - A Little  (with slide board)   4. Standing up from a chair using your arms (e.g., wheelchair, or bedside chair)? 1 - Total   5. To walk in hospital room? 1 - Total   6. Climbing 3-5 steps " with a railing? 1 - Total   Basic Mobility Raw Score (Score out of 24.Lower scores equate to lower levels of function) 14   Total Evaluation Time   Total Evaluation Time (Minutes) 5       Please Contact me with any questions or concerns. Thank you for for patience and cooperation.     Gelacio Zuniga PT, DPT  Flexible Workforce Physical Therapist   [facility]  [email or web address]

## 2018-12-18 NOTE — PROGRESS NOTES
[place] [facility] DISCHARGE NOTE    Patient discharged to home at 2:43 PM via wheel chair. Accompanied by spouse and staff. Discharge instructions reviewed with patient and spouse, opportunity offered to ask questions. Prescriptions sent to patients preferred pharmacy. All belongings sent with patient.    Shanelle Hernandez

## 2018-12-18 NOTE — PLAN OF CARE
Much better pain control today than yesterday. Did have a good night. Today is working with therapies to safely function at home with his inability to bear weight. Wife is taking scripts for some home equipment:walker, wheelchair and transfer board to a medical supply place. She will be taking script to her own pharmacy to fill the oxycodone. Patient still has swelling, bruising, stockinette applied to BLEs as instructed by Elicia STANTON. Plan is to discharge once physical therapy sees him. Patient feels that he is able to function at home.

## 2018-12-18 NOTE — PLAN OF CARE
Discharge Planner PT   Patient plan for discharge: Home with home PT  Current status: SBA with bed mobility, SBA with slide board transfers from bed to chair and chair to bed, verbal cues for slide board technique  Barriers to return to prior living situation: NWB status, increased need of assistance  Recommendations for discharge: Home with home PT  Rationale for recommendations: good with slide board, wife able to assist, PT judgement       Entered by: Gelacio Zuniga 12/18/2018 2:45 PM      Please Contact me with any questions or concerns. Thank you for for patience and cooperation.     Gelacio Zuniga PT, DPT  Flexible Workforce Physical Therapist   [facility]  [email or web address]

## 2018-12-18 NOTE — PLAN OF CARE
Discharge Planner PT   Patient plan for discharge: home with home PT  Current status: SBA with bed mobility, SBA with slide board transfers from bed to chair and chair to bed  Barriers to return to prior living situation: NWB status, increased need of assist  Recommendations for discharge: home with home PT  Rationale for recommendations: good with slide board, wife able to assist, PT judgement.        Entered by: Gelacio Zuniga 12/18/2018 2:49 PM      Please Contact me with any questions or concerns. Thank you for for patience and cooperation.     Gelacio Zuniga PT, DPT  Flexible Workforce Physical Therapist   [facility]  [email or web address]

## 2018-12-18 NOTE — DISCHARGE SUMMARY
Physical Therapy Discharge Summary    Reason for therapy discharge:    Discharged to home with home therapy.    Progress towards therapy goal(s). [facility] for goal details.  Goals partially met.  Barriers to achieving goals:   limited tolerance for therapy and discharge from facility.    Therapy recommendation(s):    Continued therapy is recommended.  Rationale/Recommendations:  to build up function.  Continue home exercise program.       Please Contact me with any questions or concerns. Thank you for for patience and cooperation.     Gelacio Zuniga PT, DPT  Flexible Workforce Physical Therapist   [facility]  [email or web address]

## 2018-12-18 NOTE — PHARMACY - DISCHARGE MEDICATION RECONCILIATION
Discharge medication review for this patient is complete. Pharmacist assisted with medication reconciliation of discharge medications with prior to admission medications.     The following changes were made to the discharge medication list based on pharmacist review:  Added:  Oxycodone and acetaminophen  Discontinued: NA  Changed: increase aspirin to 325 mg daily      Patient's Discharge Medication List  - medications as listed on After Visit Summary (AVS)     Review of your medicines      START taking      Dose / Directions   acetaminophen 325 MG tablet  Commonly known as:  TYLENOL      Dose:  975 mg  Take 3 tablets (975 mg) by mouth 3 times daily  Quantity:  270 tablet  Refills:  0     aspirin 325 MG EC tablet  Commonly known as:  ASA  Replaces:  aspirin 81 MG tablet      Dose:  325 mg  Take 1 tablet (325 mg) by mouth daily  Quantity:  10 tablet  Refills:  0     order for DME      Equipment being ordered: Wheelchair, walker with wheels  Quantity:  2 Units  Refills:  0     order for DME      Equipment being ordered: CAM walker boot  Quantity:  2 Units  Refills:  0     order for DME      Equipment being ordered: slide board  Quantity:  1 Units  Refills:  0     oxyCODONE 5 MG tablet  Commonly known as:  ROXICODONE      Dose:  5-10 mg  Take 1-2 tablets (5-10 mg) by mouth every 6 hours as needed  Quantity:  10 tablet  Refills:  0        STOP taking    aspirin 81 MG tablet  Commonly known as:  ASA  Replaced by:  aspirin 325 MG EC tablet              Where to get your medicines      These medications were sent to [facility] [place], [place] - [address]  [address] [address]    Phone:  [phone]     acetaminophen 325 MG tablet     Some of these will need a paper prescription and others can be bought over the counter. Ask your nurse if you have questions.    Bring a paper prescription for each of these medications    order for DME    order for DME    order for DME    oxyCODONE 5 MG  tablet  You don't need a prescription for these medications    aspirin 325 MG EC tablet

## 2018-12-18 NOTE — PLAN OF CARE
Adult Inpatient Plan of Care  Plan of Care Review  12/18/2018 0644 - Improving by Goldie Tiwari RN     Adult Inpatient Plan of Care  Patient-Specific Goal (Individualization)  12/18/2018 0644 - Improving by Goldie Tiwari RN  12/18/2018 0622 - No Change by Goldie Tiwari RN  12/17/2018 1823 - No Change by Shanelle Hernandez, RN     Adult Inpatient Plan of Care  Absence of Hospital-Acquired Illness or Injury  12/18/2018 0644 - Improving by Goldie Tiwari RN  12/18/2018 0622 - No Change by Goldie Tiwari RN  12/17/2018 1823 - No Change by Shanelle Hernandez, RN     Adult Inpatient Plan of Care  Absence of Hospital-Acquired Illness or Injury  12/18/2018 0644 - Improving by Goldie Tiwari, RN

## 2018-12-18 NOTE — PROGRESS NOTES
"[facility] Progress Note      Bilateral calcaneal fx        Subjective:    Pain: minimal  aching to bilateral heels. Denies shooting pain, parasthesias, numbness and weakness.   denies Chest pain, SOB, O2 required: None  denies nausea/ emesis  denies lightheadedness, dizziness and weakness        Objective:  Blood pressure 106/62, pulse 80, temperature 97.4  F (36.3  C), temperature source Oral, resp. rate 16, height 1.727 m (5' 8\"), weight 69 kg (152 lb 1.9 oz), SpO2 96 %.    Patient Vitals for the past 24 hrs:   BP Temp Temp src Pulse Resp SpO2 Weight   12/18/18 0817 106/62 97.4  F (36.3  C) Oral 80 16 96 % --   12/18/18 0500 -- -- -- -- -- -- 69 kg (152 lb 1.9 oz)   12/18/18 0342 99/65 98.9  F (37.2  C) Oral 73 16 95 % --   12/17/18 2238 99/54 98.2  F (36.8  C) Oral 70 20 97 % --   12/17/18 1900 -- 99.2  F (37.3  C) Oral 71 16 94 % --   12/17/18 1622 -- 97.9  F (36.6  C) Oral 78 12 97 % --   12/17/18 1159 111/72 98.1  F (36.7  C) Oral 69 16 98 % --       Wt Readings from Last 4 Encounters:   12/18/18 69 kg (152 lb 1.9 oz)   10/15/18 70.2 kg (154 lb 12.8 oz)   06/19/13 73.9 kg (163 lb)   11/26/12 69.4 kg (153 lb)     General: Alert and orientated. No apparent distress. Non-labored breathing. Appropriate affect.   MSK: bilateral ankles with mild edema, no sensory deficits, mobility intact    Pertinent Labs   Lab Results: personally reviewed.     Recent Labs   Lab Test 12/18/18  0651 12/16/18  1836 06/22/11  0832   HGB 13.8 14.4  --    HCT 41.4 43.0  --    MCV 97 97  --     216  --     137 136           Plan:             Pain medications:  oxycodone, tylenol, decrease narcotic use today            Weight bearing status:  NWB in CAM boots            Given patient NWB bilatearlly x 10-12 weeks, he will require the use of a wheelchair for mobility and walker for stability. Discussed this with the patient and he understands that his limited weight bearing restrictions will prevent him from " ambulation and the temporary use of a wheelchair and walker for balance stability.             Disposition:  Home today after passing PT             Follow up: 2 week with Dr. Pacheco            Continue cares and rehabilitation     Report completed by:  Jocelynn Salinas PA-C  Date: 12/18/2018  Time: 8:43 AM

## 2018-12-18 NOTE — DISCHARGE INSTRUCTIONS
[facility] should be calling you to set up an appointment to return to see Dr. Nicolas Pacheco. If you don't hear from them by Friday-call them at [phone]. Plan is to gt the appointment set-up for next week.

## 2018-12-18 NOTE — PLAN OF CARE
Discharge Planner OT   Patient plan for discharge: Home with spouse    Current status: Met with patient to discuss AE recommendations and ADL techniques to maintain B LE NWB precautions. Pt demo's independence with lower body dressing supine in bed with rolling techniques. Discussed various types of commodes and shower chairs that work best with slide board transfers. Pt reports bathroom at home is big enough to have w/c right next to toilet and has UE support near toilet to assist with transfers. Discussed reacher and benefits of this AE during ADL/IADL tasks.     Barriers to return to prior living situation: stairs- pt states he is borrowing an electric w/c that goes up stairs. Pt/families ability to get all AE today.     Recommendations for discharge: Home with w/c, transfer board, shower chair (flat). Commode and portable urinal may be beneficial for toileting at night- recommended this to patient.     Rationale for recommendations: PT to address slide board transfers. Pt active at baseline, has good family support    Occupational Therapy Discharge Summary    Reason for therapy discharge:    All goals and outcomes met, no further needs identified.    Progress towards therapy goal(s). See goals on Care Plan in [facility] electronic health record for goal details.  Goals met    Therapy recommendation(s):    No further OT needs identified.  PT to address transfers within B LE NWB precautions            Entered by: Anna Cassidy 12/18/2018 12:24 PM

## 2018-12-18 NOTE — DISCHARGE SUMMARY
[facility] Discharge Summary    Mario David MRN# [id number]   Age: 53 year old YOB: 1965     Date of Admission:  12/16/2018  Date of Discharge::  12/18/2018  Admitting Physician:  Herberth Gray MD  Discharge Physician:  Brennan Malik MD, MD             Admission Diagnoses:   Fall, initial encounter [W19.XXXA]  Closed fracture of both calcanei, initial encounter [S92.001A, S92.002A]          Principle Discharge Diagnosis:     Closed bilateral calcaneal fractures    See hospital course for further active diagnoses addressed during this admission.            Procedures:   No procedures performed during this admission          Medications Prior to Admission:     Medications Prior to Admission   Medication Sig Dispense Refill Last Dose     [DISCONTINUED] aspirin 81 MG tablet Take 1 tablet (81 mg) by mouth daily 90 tablet 3 More than a month at Unknown time             Discharge Medications:     Current Discharge Medication List      START taking these medications    Details   acetaminophen (TYLENOL) 325 MG tablet Take 3 tablets (975 mg) by mouth 3 times daily  Qty: 270 tablet, Refills: 0    Associated Diagnoses: Closed fracture of both calcanei, initial encounter      aspirin (ASA) 325 MG EC tablet Take 1 tablet (325 mg) by mouth daily  Qty: 10 tablet, Refills: 0    Associated Diagnoses: Closed fracture of both calcanei, initial encounter      !! order for DME Equipment being ordered: Wheelchair, walker with wheels  Qty: 2 Units, Refills: 0    Associated Diagnoses: Closed fracture of both calcanei, initial encounter      !! order for DME Equipment being ordered: CAM walker boot  Qty: 2 Units, Refills: 0    Associated Diagnoses: Closed fracture of both calcanei, initial encounter      !! order for DME Equipment being ordered: slide board  Qty: 1 Units, Refills: 0    Associated Diagnoses: Closed fracture of both calcanei, initial encounter      oxyCODONE (ROXICODONE) 5 MG tablet Take 1-2  "tablets (5-10 mg) by mouth every 6 hours as needed  Qty: 10 tablet, Refills: 0    Associated Diagnoses: Closed fracture of both calcanei, initial encounter       !! - Potential duplicate medications found. Please discuss with provider.      STOP taking these medications       aspirin 81 MG tablet Comments:   Reason for Stopping:                     Consultations:   Consultation during this admission received from podiatry/orthopedics           Brief History of Illness:     From Admission H+P:   Mario David is a 53 year old male with the above past medical history now presents on 12/16/2018 with foot pain after mechanical fall.     Patient was up on the roof of his camper clearing leaves and ice off of it when he lost his balance and fell onto the ground, landing square on both feet. He felt instant pain in both feet, has pain with ambulation and weight bearing. His feet are very painful, swollen, and bruised.      He denies any back pain (current or recent), no syncope, loss of consciousness, or head trauma related to his fall.     On review of systems he denies recent fever or chills, chest pain, palpitations, cough, wheeze, shortness of breath, abdominal pain, nausea, vomiting, diarrhea, constipation, melena, hematochezia, dysuria, skin changes or rash, pain other than foot pain, headache, vision changes, syncope, dizziness, numbness, tingling, focal weakness, confusion, or slurred speech.                     TODAY:     Subjective:  Doing well, pain well-controlled today with intermittent oxycodone and ibuprofen/tylenol.  Patient did well with physical therapy per his and their report.  No fever or chills.  No dyspnea.  Patient requesting discharge home today which physical therapy thought was reasonable.    No other pain.     ROS:   ROS: 10 point ROS neg other than the symptoms noted above in the HPI.     /62   Pulse 80   Temp 97.4  F (36.3  C) (Oral)   Resp 16   Ht 1.727 m (5' 8\")   Wt 69 kg (152 lb " 1.9 oz)   SpO2 96%   BMI 23.13 kg/m     EXAM:  General: awake and alert, NAD, oriented x 3  Head: normocephalic  Neck: unremarkable, no lymphadenopathy   HEENT: oropharynx pink and moist    Heart: Regular rate and rhythm, no murmurs, rubs, or gallops  Lungs: clear to auscultation bilaterally with good air movement throughout  Abdomen: soft, non-tender, no masses or organomegaly  Extremities: no edema in lower extremities   Skin unremarkable.            Hospital Course:        Mario David is a 53 year old male with a past medical history significant for tobacco abuse who presents on 12/16/2018 with bilateral calcaneal fractures after mechanical fall.        Closed bilateral calcaneal fractures  12/17/18 -- Occurred after mechanical fall, landing square on his feet. X-ray of feet and ankle show bilateral displaced, comminuted, intraarticular fractures of bilateral calcanei. CT of feet with comminuted bilateral calcaneus fractures. Case discussed between emergency department and ortho / podiatry, podiatry to see.  - Ortho consult  - Pain control - prn dilaudid & oxycodone available  - OK to eat, no plans for surgery today - regular diet  - Hold prior to admission aspirin  12/18/2018 -- doing well, ok for discharge on oxycodone, ibuprofen and tylenol prn.  Follow-up with Dr. Pacheco in about 1 week, likely surgery about 2 weeks from now.  Non-weight bearing in the meantime, has boots and comfortable with these.         Fall  Mechanical fall off of a camper roof. Ho head trauma, syncope, or loss of consciousness. Due to nature of fall, imaging of back was performed - CT of lumbar and thoracic spine without any identified fractures, but there was some degenerative disc disease, osteophytes, stenosis, and disc protrusion (see full CT report for details). Patient denies any current or recent back pain.  - No further work-up required at this time       Tobacco abuse  Encouraged cessation. Nicotine patch ordered while here.    Says he has nicotine replacement at home, wife says she won't buy him cigarettes so he says he'll have to quit - was motivated to quit soon regardless, seems happy with this plan.        DVT Prophylaxis: Enoxaparin (Lovenox) subcutaneous    Code Status: Full Code - discussed directly with patient               Discharge Instructions and Follow-Up:   Discharge diet: Regular   Discharge activity: See ortho notes- non-weight bearing.     Discharge follow-up: Follow-up with Dr. Pacheco in about 1 week.               Discharge Disposition:   Discharged to home      Attestation:  I have reviewed today's vital signs, notes, medications, labs and imaging.  Amount of time performed on this discharge summary: 30 minutes.    Brennan Malik MD, MD

## 2018-12-18 NOTE — PROGRESS NOTES
"   12/18/18 1200   Quick Adds   Type of Visit Initial Occupational Therapy Evaluation   Living Environment   Lives With child(heike), dependent;spouse   Living Arrangements house   Home Accessibility not wheelchair accessible;stairs to enter home;stairs within home   Living Environment Comment walk-in shower   Self-Care   Activity/Exercise/Self-Care Comment Pt states wife is currently working on getting: w/c, slide board and shower chair.    Functional Level   Ambulation 0-->independent   Transferring 0-->independent   Toileting 0-->independent   Bathing 0-->independent   Dressing 0-->independent   Eating 0-->independent   Communication 0-->understands/communicates without difficulty   Swallowing 0-->swallows foods/liquids without difficulty   Cognition 0 - no cognition issues reported   Fall history within last six months yes   Number of times patient has fallen within last six months 1  (reason for admit- fall off camper roof)   General Information   Onset of Illness/Injury or Date of Surgery - Date 12/16/18   Referring Physician Brennan Malik MD   Patient/Family Goals Statement to return home.    Additional Occupational Profile Info/Pertinent History of Current Problem Mario David is a 53 year old male with a past medical history significant for tobacco abuse who presents on 12/16/2018 with bilateral calcaneal fractures after mechanical fall.   Weight-Bearing Status - LLE nonweight-bearing   Weight-Bearing Status - RLE nonweight-bearing   Cognitive Status Examination   Orientation orientation to person, place and time   Level of Consciousness alert   Pain Assessment   Patient Currently in Pain Yes, see Vital Sign flowsheet  (\"3-4/10\")   Range of Motion (ROM)   ROM Comment B UE ROM: WNL   Strength   Strength Comments B UE strength: WNL for ADLs   Transfer Skills   Transfer Comments PT to address transfer skills this afternoon.    Upper Body Dressing   Level of [place]: Dress Upper Body independent   Lower " "Body Dressing   Level of [place]: Dress Lower Body independent  (following education )   Instrumental Activities of Daily Living (IADL)   IADL Comments Pt states family can assist.    Activities of Daily Living Analysis   Impairments Contributing to Impaired Activities of Daily Living pain   ADL Comments NWB on B LE's   General Therapy Interventions   Planned Therapy Interventions ADL retraining   Intervention Comments AE training   Clinical Impression   Criteria for Skilled Therapeutic Interventions Met yes, treatment indicated   OT Diagnosis decreased independence with ADLs/IADLs   Influenced by the following impairments NWB on B LE's.    Assessment of Occupational Performance 5 or more Performance Deficits   Identified Performance Deficits dressing, toileting, home management tasks, functional mobility   Clinical Decision Making (Complexity) Low complexity   Therapy Frequency daily   Predicted Duration of Therapy Intervention (days/wks) 1x treat- pt likely discharging today following PT   Anticipated Equipment Needs at Discharge reacher;shower chair;sliding board;wheelchair   Anticipated Discharge Disposition Home with Assist   Risks and Benefits of Treatment have been explained. Yes   Patient, Family & other staff in agreement with plan of care Yes   [facility] AM-PAC  \"6 Clicks\" Daily Activity Inpatient Short Form   1. Putting on and taking off regular lower body clothing? 4 - None   2. Bathing (including washing, rinsing, drying)? 3 - A Little   3. Toileting, which includes using toilet, bedpan or urinal? 3 - A Little   4. Putting on and taking off regular upper body clothing? 4 - None   5. Taking care of personal grooming such as brushing teeth? 4 - None   6. Eating meals? 4 - None   Daily Activity Raw Score (Score out of 24.Lower scores equate to lower levels of function) 22   Total Evaluation Time   Total Evaluation Time (Minutes) 6     "

## 2018-12-18 NOTE — PLAN OF CARE
Patient had more pain issues this evening. Dr. Pacheco here and saw patient and said he will write for additional pain meds. No additional orders received as yet. Anticipate patient will be discharged tomorrow, after he has soft cast boots applied( Dr. Pacheco states that he will talk to Jocelynn CONTRERAS and have her obtain and apply these boots. Physical Therapy and Occupational Therapy ordered. Will need a wheelchair and transfer board at discharge. Therapies to determine the need for any other equipment needed. Did start senna as expect that being immobile and taking the narcotics he will be prone to constipation.

## 2018-12-18 NOTE — PLAN OF CARE
Patient pain controlled with 10 mg oxycodone every 3-4 hours. Moving about in bed very well. Reinforced need to keep feet elevated and keep ice on feet.  Brusing around both feet and ankles.Cast boots received from emergency department., to be placed today. Using urinal in bed.

## 2018-12-20 ENCOUNTER — TELEPHONE (OUTPATIENT)
Dept: FAMILY MEDICINE | Facility: CLINIC | Age: 53
End: 2018-12-20

## 2018-12-20 NOTE — TELEPHONE ENCOUNTER
ED / Discharge Outreach Protocol    Patient Contact    Attempt # 1    Was call answered?  Yes.  No response on line, Line went dead, will try again later.

## 2018-12-21 NOTE — TELEPHONE ENCOUNTER
"ED/Discharge Protocol    \"Hi, my name is Carmen Savage, a registered nurse, and I am calling on behalf of Dr. Hernandez's office at [facility].  I am calling to follow up and see how things are going for you after your recent visit.\"    \"I see that you were in the (ER/UC/IP) on 12/16/18-12/18/18.    How are you doing now that you are home?\" Doing ok, just bedridden    Is patient experiencing symptoms that may require a hospital visit?  none    Discharge Instructions    \"Let's review your discharge instructions.  What is/are the follow-up recommendations?  Pt. Response:   Discharge Instructions and Follow-Up:   Discharge diet: Regular   Discharge activity: See ortho notes- non-weight bearing.     Discharge follow-up: Follow-up with Dr. Pacheco in about 1 week           \"Were you instructed to make a follow-up appointment?\"  Pt. Response: Yes.  Has appointment been made?   Yes - Next Friday    \"When you see the provider, I would recommend that you bring your discharge instructions with you.    Medications    \"How many new medications are you on since your hospitalization/ED visit?\"    0-1  \"How many of your current medicines changed (dose, timing, name, etc.) while you were in the hospital/ED visit?\"   0-1  \"Do you have questions about your medications?\"   No  \"Were you newly diagnosed with heart failure, COPD, diabetes or did you have a heart attack?\"   No  For patients on insulin: \"Did you start on insulin in the hospital or did you have your insulin dose changed?\"   No    Medication reconciliation completed? Yes    Was MTM referral placed (*Make sure to put transitions as reason for referral)?   No    Call Summary    \"Do you have any questions or concerns about your condition or care plan at the moment?\"    No  Triage nurse advice given: Call if symptoms arise.     Patient was in ER 1 in the past year (assess appropriateness of ER visits.)      \"If you have questions or things don't continue to improve, we encourage " "you contact us through the main clinic number,  [phone].  Even if the clinic is not open, triage nurses are available 24/7 to help you.     We would like you to know that our clinic has extended hours (provide information).  We also have urgent care (provide details on closest location and hours/contact info)\"      \"Thank you for your time and take care!\"     YURI Rider, RN        "

## 2018-12-28 ENCOUNTER — ANESTHESIA EVENT (OUTPATIENT)
Dept: SURGERY | Facility: CLINIC | Age: 53
End: 2018-12-28
Payer: COMMERCIAL

## 2018-12-28 ASSESSMENT — LIFESTYLE VARIABLES: TOBACCO_USE: 1

## 2018-12-28 NOTE — ANESTHESIA PREPROCEDURE EVALUATION
Anesthesia Pre-Procedure Evaluation    Patient: Mario David   MRN: [id number] : 1965          Preoperative Diagnosis: bilateral calcaneus fractures    Procedure(s):  OPEN REDUCTION INTERNAL FIXATION of Bilateral CALCANEOUS Fractures    Past Medical History:   Diagnosis Date     Tobacco dependence      Past Surgical History:   Procedure Laterality Date     NO HISTORY OF SURGERY         Anesthesia Evaluation     . Pt has not had prior anesthetic            ROS/MED HX    ENT/Pulmonary:     (+)tobacco use, Past use , . .    Neurologic:  - neg neurologic ROS     Cardiovascular:  - neg cardiovascular ROS       METS/Exercise Tolerance:  >4 METS   Hematologic:  - neg hematologic  ROS       Musculoskeletal: Comment: Closed bilateral calcaneal fractures        GI/Hepatic:  - neg GI/hepatic ROS       Renal/Genitourinary:  - ROS Renal section negative       Endo:  - neg endo ROS       Psychiatric:  - neg psychiatric ROS       Infectious Disease:  - neg infectious disease ROS       Malignancy:      - no malignancy   Other:    - neg other ROS                      Physical Exam  Normal systems: cardiovascular and pulmonary    Airway   Mallampati: II    Dental   (+) missing    Cardiovascular       Pulmonary             Lab Results   Component Value Date    WBC 10.5 2018    HGB 13.8 2018    HCT 41.4 2018     2018     2018    POTASSIUM 4.0 2018    CHLORIDE 104 2018    CO2 26 2018    BUN 11 2018    CR 0.82 2018     (H) 2018    LAURIE 8.1 (L) 2018    ALBUMIN 4.1 2011    PROTTOTAL 7.0 2011    ALT 7 2011    AST 22 2011    ALKPHOS 72 2011    BILITOTAL 0.6 2011       Preop Vitals  BP Readings from Last 3 Encounters:   18 106/62   10/15/18 114/76   13 121/73    Pulse Readings from Last 3 Encounters:   18 80   10/15/18 83   13 81      Resp Readings from Last 3 Encounters:   18 16     "SpO2 Readings from Last 3 Encounters:   12/18/18 96%   10/15/18 98%      Temp Readings from Last 1 Encounters:   12/18/18 36.3  C (97.4  F) (Oral)    Ht Readings from Last 1 Encounters:   12/16/18 1.727 m (5' 8\")      Wt Readings from Last 1 Encounters:   12/18/18 69 kg (152 lb 1.9 oz)    Estimated body mass index is 23.13 kg/m  as calculated from the following:    Height as of 12/16/18: 1.727 m (5' 8\").    Weight as of 12/18/18: 69 kg (152 lb 1.9 oz).       Anesthesia Plan      History & Physical Review  History and physical reviewed and following examination; no interval change.    ASA Status:  2 .    NPO Status:  > 6 hours    Plan for General with Intravenous and Propofol induction. Maintenance will be Balanced.    PONV prophylaxis:  Ondansetron (or other 5HT-3) and Dexamethasone or Solumedrol  Additional equipment: Videolaryngoscope      Postoperative Care  Postoperative pain management:  IV analgesics.      Consents  Anesthetic plan, risks, benefits and alternatives discussed with:  Patient..                 Arnulfo Aguilar CRNA, APRN CRNA  "

## 2018-12-31 ENCOUNTER — ANESTHESIA (OUTPATIENT)
Dept: SURGERY | Facility: CLINIC | Age: 53
End: 2018-12-31
Payer: COMMERCIAL

## 2018-12-31 ENCOUNTER — HOSPITAL ENCOUNTER (OUTPATIENT)
Facility: CLINIC | Age: 53
Discharge: HOME | End: 2018-12-31
Attending: PODIATRIST | Admitting: PODIATRIST
Payer: COMMERCIAL

## 2018-12-31 ENCOUNTER — APPOINTMENT (OUTPATIENT)
Dept: GENERAL RADIOLOGY | Facility: CLINIC | Age: 53
End: 2018-12-31
Attending: PODIATRIST
Payer: COMMERCIAL

## 2018-12-31 VITALS
BODY MASS INDEX: 24.25 KG/M2 | RESPIRATION RATE: 6 BRPM | HEART RATE: 82 BPM | DIASTOLIC BLOOD PRESSURE: 80 MMHG | HEIGHT: 68 IN | OXYGEN SATURATION: 94 % | TEMPERATURE: 97.8 F | SYSTOLIC BLOOD PRESSURE: 108 MMHG | WEIGHT: 160 LBS

## 2018-12-31 DIAGNOSIS — G89.18 POSTOPERATIVE PAIN: Primary | ICD-10-CM

## 2018-12-31 PROCEDURE — 27210794 ZZH OR GENERAL SUPPLY STERILE: Performed by: PODIATRIST

## 2018-12-31 PROCEDURE — 25000128 H RX IP 250 OP 636: Performed by: NURSE ANESTHETIST, CERTIFIED REGISTERED

## 2018-12-31 PROCEDURE — 25000125 ZZHC RX 250: Performed by: NURSE ANESTHETIST, CERTIFIED REGISTERED

## 2018-12-31 PROCEDURE — 25000128 H RX IP 250 OP 636: Performed by: PODIATRIST

## 2018-12-31 PROCEDURE — 37000008 ZZH ANESTHESIA TECHNICAL FEE, 1ST 30 MIN: Performed by: PODIATRIST

## 2018-12-31 PROCEDURE — 71000014 ZZH RECOVERY PHASE 1 LEVEL 2 FIRST HR: Performed by: PODIATRIST

## 2018-12-31 PROCEDURE — 71000015 ZZH RECOVERY PHASE 1 LEVEL 2 EA ADDTL HR: Performed by: PODIATRIST

## 2018-12-31 PROCEDURE — 40000305 ZZH STATISTIC PRE PROC ASSESS I: Performed by: PODIATRIST

## 2018-12-31 PROCEDURE — 25000132 ZZH RX MED GY IP 250 OP 250 PS 637: Performed by: NURSE ANESTHETIST, CERTIFIED REGISTERED

## 2018-12-31 PROCEDURE — 36000060 ZZH SURGERY LEVEL 3 W FLUORO 1ST 30 MIN: Performed by: PODIATRIST

## 2018-12-31 PROCEDURE — 25000125 ZZHC RX 250: Performed by: PODIATRIST

## 2018-12-31 PROCEDURE — 40000277 XR SURGERY CARM FLUORO LESS THAN 5 MIN W STILLS: Mod: TC

## 2018-12-31 PROCEDURE — 25000132 ZZH RX MED GY IP 250 OP 250 PS 637: Performed by: PODIATRIST

## 2018-12-31 PROCEDURE — C1713 ANCHOR/SCREW BN/BN,TIS/BN: HCPCS | Performed by: PODIATRIST

## 2018-12-31 PROCEDURE — 27110028 ZZH OR GENERAL SUPPLY NON-STERILE: Performed by: PODIATRIST

## 2018-12-31 PROCEDURE — 36000058 ZZH SURGERY LEVEL 3 EA 15 ADDTL MIN: Performed by: PODIATRIST

## 2018-12-31 PROCEDURE — C1769 GUIDE WIRE: HCPCS | Performed by: PODIATRIST

## 2018-12-31 PROCEDURE — 71000027 ZZH RECOVERY PHASE 2 EACH 15 MINS: Performed by: PODIATRIST

## 2018-12-31 PROCEDURE — 37000009 ZZH ANESTHESIA TECHNICAL FEE, EACH ADDTL 15 MIN: Performed by: PODIATRIST

## 2018-12-31 DEVICE — IMPLANTABLE DEVICE: Type: IMPLANTABLE DEVICE | Site: FOOT | Status: FUNCTIONAL

## 2018-12-31 DEVICE — IMP SCR ARTHREX CORTICAL 3.5MMX34MM AR-8935-34: Type: IMPLANTABLE DEVICE | Site: FOOT | Status: FUNCTIONAL

## 2018-12-31 DEVICE — IMP SCR ARTHREX CORTICAL 3.5MMX32MM AR-8935-32: Type: IMPLANTABLE DEVICE | Site: FOOT | Status: FUNCTIONAL

## 2018-12-31 DEVICE — IMP SCR ARTHREX BLUE LOCKING 3.5X32MM AR-8935L-32: Type: IMPLANTABLE DEVICE | Site: FOOT | Status: FUNCTIONAL

## 2018-12-31 DEVICE — IMP SCR ARTHREX BLUE LOCKING 3.5X34MM AR-8935L-34: Type: IMPLANTABLE DEVICE | Site: FOOT | Status: FUNCTIONAL

## 2018-12-31 DEVICE — IMP SCR ARTHREX CORTICAL 3.5MMX36MM AR-8935-36: Type: IMPLANTABLE DEVICE | Site: FOOT | Status: FUNCTIONAL

## 2018-12-31 DEVICE — IMP SCR ARTHREX CAN 4.0X34MM AR-8940-34: Type: IMPLANTABLE DEVICE | Site: FOOT | Status: FUNCTIONAL

## 2018-12-31 RX ORDER — SODIUM CHLORIDE, SODIUM LACTATE, POTASSIUM CHLORIDE, CALCIUM CHLORIDE 600; 310; 30; 20 MG/100ML; MG/100ML; MG/100ML; MG/100ML
INJECTION, SOLUTION INTRAVENOUS CONTINUOUS
Status: CANCELLED | OUTPATIENT
Start: 2018-12-31

## 2018-12-31 RX ORDER — LIDOCAINE 40 MG/G
CREAM TOPICAL
Status: DISCONTINUED | OUTPATIENT
Start: 2018-12-31 | End: 2018-12-31 | Stop reason: HOSPADM

## 2018-12-31 RX ORDER — DEXAMETHASONE SODIUM PHOSPHATE 4 MG/ML
INJECTION, SOLUTION INTRA-ARTICULAR; INTRALESIONAL; INTRAMUSCULAR; INTRAVENOUS; SOFT TISSUE PRN
Status: DISCONTINUED | OUTPATIENT
Start: 2018-12-31 | End: 2018-12-31

## 2018-12-31 RX ORDER — FENTANYL CITRATE 50 UG/ML
INJECTION, SOLUTION INTRAMUSCULAR; INTRAVENOUS PRN
Status: DISCONTINUED | OUTPATIENT
Start: 2018-12-31 | End: 2018-12-31

## 2018-12-31 RX ORDER — LIDOCAINE 40 MG/G
CREAM TOPICAL
Status: CANCELLED | OUTPATIENT
Start: 2018-12-31

## 2018-12-31 RX ORDER — HYDROXYZINE PAMOATE 25 MG/1
25 CAPSULE ORAL 3 TIMES DAILY PRN
Qty: 26 CAPSULE | Refills: 0 | Status: SHIPPED | OUTPATIENT
Start: 2018-12-31 | End: 2019-01-10

## 2018-12-31 RX ORDER — CELECOXIB 200 MG/1
200 CAPSULE ORAL ONCE
Status: COMPLETED | OUTPATIENT
Start: 2018-12-31 | End: 2018-12-31

## 2018-12-31 RX ORDER — DEXAMETHASONE SODIUM PHOSPHATE 4 MG/ML
4 INJECTION, SOLUTION INTRA-ARTICULAR; INTRALESIONAL; INTRAMUSCULAR; INTRAVENOUS; SOFT TISSUE EVERY 10 MIN PRN
Status: DISCONTINUED | OUTPATIENT
Start: 2018-12-31 | End: 2019-01-01 | Stop reason: HOSPADM

## 2018-12-31 RX ORDER — FENTANYL CITRATE 50 UG/ML
25-50 INJECTION, SOLUTION INTRAMUSCULAR; INTRAVENOUS
Status: DISCONTINUED | OUTPATIENT
Start: 2018-12-31 | End: 2018-12-31 | Stop reason: HOSPADM

## 2018-12-31 RX ORDER — ONDANSETRON 2 MG/ML
INJECTION INTRAMUSCULAR; INTRAVENOUS PRN
Status: DISCONTINUED | OUTPATIENT
Start: 2018-12-31 | End: 2018-12-31

## 2018-12-31 RX ORDER — ONDANSETRON 4 MG/1
4 TABLET, ORALLY DISINTEGRATING ORAL EVERY 30 MIN PRN
Status: DISCONTINUED | OUTPATIENT
Start: 2018-12-31 | End: 2019-01-01 | Stop reason: HOSPADM

## 2018-12-31 RX ORDER — HYDROXYZINE HYDROCHLORIDE 50 MG/1
50 TABLET, FILM COATED ORAL ONCE
Status: COMPLETED | OUTPATIENT
Start: 2018-12-31 | End: 2018-12-31

## 2018-12-31 RX ORDER — OXYCODONE AND ACETAMINOPHEN 5; 325 MG/1; MG/1
1 TABLET ORAL EVERY 6 HOURS PRN
COMMUNITY
End: 2024-04-30

## 2018-12-31 RX ORDER — NALOXONE HYDROCHLORIDE 0.4 MG/ML
.1-.4 INJECTION, SOLUTION INTRAMUSCULAR; INTRAVENOUS; SUBCUTANEOUS
Status: DISCONTINUED | OUTPATIENT
Start: 2018-12-31 | End: 2019-01-01 | Stop reason: HOSPADM

## 2018-12-31 RX ORDER — PROPOFOL 10 MG/ML
INJECTION, EMULSION INTRAVENOUS PRN
Status: DISCONTINUED | OUTPATIENT
Start: 2018-12-31 | End: 2018-12-31

## 2018-12-31 RX ORDER — GABAPENTIN 300 MG/1
300 CAPSULE ORAL ONCE
Status: COMPLETED | OUTPATIENT
Start: 2018-12-31 | End: 2018-12-31

## 2018-12-31 RX ORDER — KETOROLAC TROMETHAMINE 30 MG/ML
30 INJECTION, SOLUTION INTRAMUSCULAR; INTRAVENOUS EVERY 6 HOURS PRN
Status: DISCONTINUED | OUTPATIENT
Start: 2018-12-31 | End: 2019-01-01 | Stop reason: HOSPADM

## 2018-12-31 RX ORDER — LABETALOL HYDROCHLORIDE 5 MG/ML
10 INJECTION, SOLUTION INTRAVENOUS
Status: DISCONTINUED | OUTPATIENT
Start: 2018-12-31 | End: 2018-12-31 | Stop reason: HOSPADM

## 2018-12-31 RX ORDER — HYDROMORPHONE HYDROCHLORIDE 1 MG/ML
.3-.5 INJECTION, SOLUTION INTRAMUSCULAR; INTRAVENOUS; SUBCUTANEOUS EVERY 10 MIN PRN
Status: DISCONTINUED | OUTPATIENT
Start: 2018-12-31 | End: 2019-01-01 | Stop reason: HOSPADM

## 2018-12-31 RX ORDER — CEFAZOLIN SODIUM 2 G/100ML
2 INJECTION, SOLUTION INTRAVENOUS
Status: COMPLETED | OUTPATIENT
Start: 2018-12-31 | End: 2018-12-31

## 2018-12-31 RX ORDER — OXYCODONE AND ACETAMINOPHEN 5; 325 MG/1; MG/1
1 TABLET ORAL
Status: COMPLETED | OUTPATIENT
Start: 2018-12-31 | End: 2018-12-31

## 2018-12-31 RX ORDER — BUPIVACAINE HYDROCHLORIDE 5 MG/ML
INJECTION, SOLUTION PERINEURAL PRN
Status: DISCONTINUED | OUTPATIENT
Start: 2018-12-31 | End: 2018-12-31 | Stop reason: HOSPADM

## 2018-12-31 RX ORDER — FENTANYL CITRATE 50 UG/ML
25-50 INJECTION, SOLUTION INTRAMUSCULAR; INTRAVENOUS
Status: DISCONTINUED | OUTPATIENT
Start: 2018-12-31 | End: 2019-01-01 | Stop reason: HOSPADM

## 2018-12-31 RX ORDER — MEPERIDINE HYDROCHLORIDE 25 MG/ML
12.5 INJECTION INTRAMUSCULAR; INTRAVENOUS; SUBCUTANEOUS
Status: DISCONTINUED | OUTPATIENT
Start: 2018-12-31 | End: 2019-01-01 | Stop reason: HOSPADM

## 2018-12-31 RX ORDER — CEFAZOLIN SODIUM 1 G/50ML
1 INJECTION, SOLUTION INTRAVENOUS SEE ADMIN INSTRUCTIONS
Status: DISCONTINUED | OUTPATIENT
Start: 2018-12-31 | End: 2018-12-31 | Stop reason: HOSPADM

## 2018-12-31 RX ORDER — NALOXONE HYDROCHLORIDE 0.4 MG/ML
INJECTION, SOLUTION INTRAMUSCULAR; INTRAVENOUS; SUBCUTANEOUS PRN
Status: DISCONTINUED | OUTPATIENT
Start: 2018-12-31 | End: 2018-12-31

## 2018-12-31 RX ORDER — ACETAMINOPHEN 325 MG/1
975 TABLET ORAL ONCE
Status: COMPLETED | OUTPATIENT
Start: 2018-12-31 | End: 2018-12-31

## 2018-12-31 RX ORDER — GLYCOPYRROLATE 0.2 MG/ML
INJECTION, SOLUTION INTRAMUSCULAR; INTRAVENOUS PRN
Status: DISCONTINUED | OUTPATIENT
Start: 2018-12-31 | End: 2018-12-31

## 2018-12-31 RX ORDER — ONDANSETRON 2 MG/ML
4 INJECTION INTRAMUSCULAR; INTRAVENOUS EVERY 30 MIN PRN
Status: DISCONTINUED | OUTPATIENT
Start: 2018-12-31 | End: 2019-01-01 | Stop reason: HOSPADM

## 2018-12-31 RX ORDER — SODIUM CHLORIDE, SODIUM LACTATE, POTASSIUM CHLORIDE, CALCIUM CHLORIDE 600; 310; 30; 20 MG/100ML; MG/100ML; MG/100ML; MG/100ML
INJECTION, SOLUTION INTRAVENOUS CONTINUOUS
Status: DISCONTINUED | OUTPATIENT
Start: 2018-12-31 | End: 2019-01-01 | Stop reason: HOSPADM

## 2018-12-31 RX ORDER — ALBUTEROL SULFATE 0.83 MG/ML
2.5 SOLUTION RESPIRATORY (INHALATION) EVERY 4 HOURS PRN
Status: DISCONTINUED | OUTPATIENT
Start: 2018-12-31 | End: 2018-12-31 | Stop reason: HOSPADM

## 2018-12-31 RX ORDER — SODIUM CHLORIDE, SODIUM LACTATE, POTASSIUM CHLORIDE, CALCIUM CHLORIDE 600; 310; 30; 20 MG/100ML; MG/100ML; MG/100ML; MG/100ML
INJECTION, SOLUTION INTRAVENOUS CONTINUOUS
Status: DISCONTINUED | OUTPATIENT
Start: 2018-12-31 | End: 2018-12-31 | Stop reason: HOSPADM

## 2018-12-31 RX ORDER — OXYCODONE AND ACETAMINOPHEN 5; 325 MG/1; MG/1
1-2 TABLET ORAL EVERY 4 HOURS PRN
Qty: 26 TABLET | Refills: 0 | Status: SHIPPED | OUTPATIENT
Start: 2018-12-31 | End: 2019-01-03

## 2018-12-31 RX ADMIN — HYDROMORPHONE HYDROCHLORIDE 0.5 MG: 1 INJECTION, SOLUTION INTRAMUSCULAR; INTRAVENOUS; SUBCUTANEOUS at 20:18

## 2018-12-31 RX ADMIN — FENTANYL CITRATE 100 MCG: 50 INJECTION, SOLUTION INTRAMUSCULAR; INTRAVENOUS at 18:24

## 2018-12-31 RX ADMIN — ONDANSETRON 4 MG: 2 INJECTION INTRAMUSCULAR; INTRAVENOUS at 16:46

## 2018-12-31 RX ADMIN — FENTANYL CITRATE 50 MCG: 50 INJECTION, SOLUTION INTRAMUSCULAR; INTRAVENOUS at 16:42

## 2018-12-31 RX ADMIN — HYDROMORPHONE HYDROCHLORIDE 0.5 MG: 1 INJECTION, SOLUTION INTRAMUSCULAR; INTRAVENOUS; SUBCUTANEOUS at 18:59

## 2018-12-31 RX ADMIN — ROCURONIUM BROMIDE 40 MG: 10 INJECTION INTRAVENOUS at 16:50

## 2018-12-31 RX ADMIN — FENTANYL CITRATE 50 MCG: 50 INJECTION, SOLUTION INTRAMUSCULAR; INTRAVENOUS at 18:44

## 2018-12-31 RX ADMIN — HYDROXYZINE HYDROCHLORIDE 50 MG: 50 TABLET, FILM COATED ORAL at 19:44

## 2018-12-31 RX ADMIN — NALOXONE HYDROCHLORIDE 0.05 MG: 0.4 INJECTION, SOLUTION INTRAMUSCULAR; INTRAVENOUS; SUBCUTANEOUS at 19:16

## 2018-12-31 RX ADMIN — SODIUM CHLORIDE, POTASSIUM CHLORIDE, SODIUM LACTATE AND CALCIUM CHLORIDE: 600; 310; 30; 20 INJECTION, SOLUTION INTRAVENOUS at 18:36

## 2018-12-31 RX ADMIN — CELECOXIB 200 MG: 200 CAPSULE ORAL at 14:57

## 2018-12-31 RX ADMIN — KETOROLAC TROMETHAMINE 30 MG: 30 INJECTION, SOLUTION INTRAMUSCULAR at 19:32

## 2018-12-31 RX ADMIN — FENTANYL CITRATE 50 MCG: 50 INJECTION, SOLUTION INTRAMUSCULAR; INTRAVENOUS at 19:40

## 2018-12-31 RX ADMIN — FENTANYL CITRATE 50 MCG: 50 INJECTION, SOLUTION INTRAMUSCULAR; INTRAVENOUS at 19:48

## 2018-12-31 RX ADMIN — GABAPENTIN 300 MG: 300 CAPSULE ORAL at 14:57

## 2018-12-31 RX ADMIN — PROPOFOL 180 MG: 10 INJECTION, EMULSION INTRAVENOUS at 16:50

## 2018-12-31 RX ADMIN — HYDROMORPHONE HYDROCHLORIDE 0.5 MG: 1 INJECTION, SOLUTION INTRAMUSCULAR; INTRAVENOUS; SUBCUTANEOUS at 18:48

## 2018-12-31 RX ADMIN — FENTANYL CITRATE 50 MCG: 50 INJECTION, SOLUTION INTRAMUSCULAR; INTRAVENOUS at 18:16

## 2018-12-31 RX ADMIN — CEFAZOLIN SODIUM 2 G: 2 INJECTION, SOLUTION INTRAVENOUS at 16:42

## 2018-12-31 RX ADMIN — SODIUM CHLORIDE, POTASSIUM CHLORIDE, SODIUM LACTATE AND CALCIUM CHLORIDE: 600; 310; 30; 20 INJECTION, SOLUTION INTRAVENOUS at 15:03

## 2018-12-31 RX ADMIN — ACETAMINOPHEN 975 MG: 325 TABLET, FILM COATED ORAL at 14:57

## 2018-12-31 RX ADMIN — FENTANYL CITRATE 50 MCG: 50 INJECTION, SOLUTION INTRAMUSCULAR; INTRAVENOUS at 17:52

## 2018-12-31 RX ADMIN — LIDOCAINE HYDROCHLORIDE 1 ML: 10 INJECTION, SOLUTION EPIDURAL; INFILTRATION; INTRACAUDAL; PERINEURAL at 15:04

## 2018-12-31 RX ADMIN — HYDROMORPHONE HYDROCHLORIDE 0.5 MG: 1 INJECTION, SOLUTION INTRAMUSCULAR; INTRAVENOUS; SUBCUTANEOUS at 20:51

## 2018-12-31 RX ADMIN — MIDAZOLAM 1 MG: 1 INJECTION INTRAMUSCULAR; INTRAVENOUS at 16:45

## 2018-12-31 RX ADMIN — MIDAZOLAM 1 MG: 1 INJECTION INTRAMUSCULAR; INTRAVENOUS at 16:42

## 2018-12-31 RX ADMIN — DEXAMETHASONE SODIUM PHOSPHATE 4 MG: 4 INJECTION, SOLUTION INTRA-ARTICULAR; INTRALESIONAL; INTRAMUSCULAR; INTRAVENOUS; SOFT TISSUE at 16:45

## 2018-12-31 RX ADMIN — FENTANYL CITRATE 50 MCG: 50 INJECTION, SOLUTION INTRAMUSCULAR; INTRAVENOUS at 19:35

## 2018-12-31 RX ADMIN — FENTANYL CITRATE 100 MCG: 50 INJECTION, SOLUTION INTRAMUSCULAR; INTRAVENOUS at 17:24

## 2018-12-31 RX ADMIN — FENTANYL CITRATE 50 MCG: 50 INJECTION, SOLUTION INTRAMUSCULAR; INTRAVENOUS at 18:34

## 2018-12-31 RX ADMIN — SODIUM CHLORIDE, POTASSIUM CHLORIDE, SODIUM LACTATE AND CALCIUM CHLORIDE 1000 ML: 600; 310; 30; 20 INJECTION, SOLUTION INTRAVENOUS at 20:12

## 2018-12-31 RX ADMIN — FENTANYL CITRATE 50 MCG: 50 INJECTION, SOLUTION INTRAMUSCULAR; INTRAVENOUS at 16:45

## 2018-12-31 RX ADMIN — OXYCODONE HYDROCHLORIDE AND ACETAMINOPHEN 1 TABLET: 5; 325 TABLET ORAL at 21:35

## 2018-12-31 RX ADMIN — GLYCOPYRROLATE 0.2 MG: 0.2 INJECTION, SOLUTION INTRAMUSCULAR; INTRAVENOUS at 16:46

## 2018-12-31 RX ADMIN — FENTANYL CITRATE 50 MCG: 50 INJECTION, SOLUTION INTRAMUSCULAR; INTRAVENOUS at 20:05

## 2018-12-31 ASSESSMENT — MIFFLIN-ST. JEOR: SCORE: 1540.26

## 2019-01-01 NOTE — ANESTHESIA CARE TRANSFER NOTE
Patient: Mario David    Procedure(s):  OPEN REDUCTION INTERNAL FIXATION of Bilateral CALCANEOUS Fractures    Diagnosis: bilateral calcaneus fractures  Diagnosis Additional Information: No value filed.    Anesthesia Type:   General     Note:  Airway :Nasal Cannula  Patient transferred to:Phase II  Handoff Report: Identifed the Patient, Identified the Reponsible Provider, Reviewed the pertinent medical history, Discussed the surgical course, Reviewed Intra-OP anesthesia mangement and issues during anesthesia, Set expectations for post-procedure period and Allowed opportunity for questions and acknowledgement of understanding      Vitals: (Last set prior to Anesthesia Care Transfer)    CRNA VITALS  12/31/2018 1853 - 12/31/2018 1927      12/31/2018             Resp Rate (observed):  3  (Abnormal)                 Electronically Signed By: Arnulfo Aguilar CRNA, CHING LARKIN  December 31, 2018  7:27 PM

## 2019-01-01 NOTE — ANESTHESIA POSTPROCEDURE EVALUATION
Patient: Mario [facility]    Procedure(s):  OPEN REDUCTION INTERNAL FIXATION of Bilateral CALCANEOUS Fractures    Diagnosis:bilateral calcaneus fractures  Diagnosis Additional Information: No value filed.    Anesthesia Type:  General    Note:  Anesthesia Post Evaluation    Patient location during evaluation: Bedside  Patient participation: Able to fully participate in evaluation  Level of consciousness: awake and alert  Pain management: adequate  Airway patency: patent  Cardiovascular status: acceptable  Respiratory status: acceptable  Hydration status: acceptable  PONV: none     Anesthetic complications: None          Last vitals:  Vitals:    12/31/18 1930 12/31/18 1945 12/31/18 2000   BP: (!) 135/105 (!) 134/96 (!) 133/94   Resp: 16 12 12   Temp: 36.6  C (97.8  F)     SpO2: 100% 100% 100%         Electronically Signed By: Neal Nunez CRNA, APRN TRAE  December 31, 2018  8:13 PM

## 2019-01-01 NOTE — OP NOTE
[facility] ORTHOPEDICS OPERATIVE REPORT  Operative Report - Orthopedics  Mario David,  1965, MRN [id number]    Surgery Date: 18    PCP: No Ref-Primary, Physician, None   Code status:  Prior       OPERATION SITE:  [facility] Operating Room       OPERATIVE REPORT  DR. PADMINI BAXTER  FOOT & ANKLE SURGEON  [facility] ORTHOPEDICS    DATE OF PROCEDURE: 18    SITE: [facility] Operating Room    SURGEON: Dr. Padmini Baxter - [facility] Orthopedics    ASSISTANT: Linnea Polo, PGY II, [facility]      Pre-Operative Diagnosis:  1.  Bilateral Reeves 2 articular displaced calcaneus fractures    Post-Operative Diagnosis:  1.  Bilateral Reeves 2 articular displaced calcaneus fractures    Procedures Performed:  1.  Open reduction internal fixation right calcaneus fracture  2.  Open reduction internal fixation left calcaneus fracture  3.  Intraoperative fluoroscopic assistance and stress exam under anesthesia  4.  Posterior splint application bilateral below the knee ankle neutral, fiberglass    Anesthesia: Monitored Anesthesia Care with Local/Regional  Hemostasis: Ankle Tourniquet at 250mmHg  EBL: < 10 mL  Findings: Bilateral articular and displaced calcaneal Reeves 2 fractures with sustentacular component, lateral component, anterior lateral and medial components primarily.  Adequate bone stock was appreciated.  Articular damage at the level of the fracture line without additional osteochondral defect nor damage appreciated.  Implants: To fixate the bilateral calcaneus fracture margins the Arthrex calcaneus fracture system was utilized including the minimally invasive sinus Tarsi approach and plate with locking and nonlocking 3.0 and 4.0 screws for anatomic alignment and internal fixation through sinus Tarsi incision bilaterally.  Specimens: None    Indications for the Operation:  The patient has been seen and evaluated in clinic for the above-mentioned diagnoses.  They have failed to  respond to nonoperative care measures and/or surgical care for condition was indicated.  They have elected to proceed as recommended/indicated with surgical care after a thorough discussion of the associated pros, cons, risks and benefits of the operations as well as the postoperative course and details.  All associated questions were answered.  Verbal and written form informed consent was obtained.  Please see additional information within the clinical notes.    Description of the Procedure:  Patient was seen and evaluated in the preoperative holding area.  The surgical site was marked.  The consent was signed.  The H&P was updated/reviewed.  Patient was transported from the preoperative holding area to the surgical suite.  The patient was placed on the operative table.  Anesthesia was obtained.  Antibiotics were administered via IV.  Tourniquet was applied.  The operative extremity was prepped and draped sterilely.  Then, a timeout was performed to identify the proper patient, surgical site and the procedures to be performed.  Local anesthetic was infiltrated about the operative margins for regional blockade utilizing a one-to-one mixture of 2% lidocaine plain and 0.5% Marcaine plain approximately 40 cc of the mixture was utilized.  The foot/ankle was exsanguinated, and the tourniquet was inflated.    Attention was then directed to the right side first.  An incision was made from the distal fibular margin to the more anterior process of the calcaneus.  This was then carefully dissected down in layers with neurovascular identification and retraction.  The extensor digitorum brevis was reflected off of the calcaneal margin and hematoma with capsular entry and release was appreciated.  Portions of the interosseous ligament were released.  Thorough irrigation lavage the joint was conducted removing fragmentation and some comminution segments.  Then the fracture margins were explored as was recreatable from the  3-dimensional orchestration and interpretation of the CT scan.  The lateral articular fragment and the sustentacular fragment were reapproximated and with a wooden handled elevator put back up and into place underneath the talus with articulation alignment restored and height and joint alignment restored.  A Colindres elevator was utilized to dissect off the peroneal tendons and the lateral margin to allow for sinus Tarsi plate.  Temporization with 0.62 inch K wires was conducted.  This connected everything to the sustentacular fragment particularly first.  Then, the anterior calcaneal component was approximated and pinned into place with a temporary smooth K wire.  Then, the tuberosity was adjusted with a Schanz pin along with the Essex Lopresti maneuver.  This allowed anatomic restoration of the relationships of the calcaneal components.  Temporary fixation and alignment anatomically relation to height, width, articular relationship along with signs and Boehler's angle was confirmed with intraoperative fluoroscopic assistance.  Then, the lateral sinus Tarsi plate was applied first with compression screws across the articular margin of the posterior and then with locking and nonlocking screws about the periphery to align fragments to each other and to stabilize.  To stabilize the tuberosity fragment the sustentaculum 2 guidewires for 4.0 screws were placed.  A partially threaded screw along with a fully threaded screws were placed to allow for compression and maintenance of alignment along with height.  All final fixation was confirmed with Broden's views, AP views, lateral views and axial views of the heel confirming anatomic alignment on this side.      Attention was then directed to the left side.  An incision was made from the distal fibular margin to the more anterior process of the calcaneus.  This was then carefully dissected down in layers with neurovascular identification and retraction.  The extensor digitorum  brevis was reflected off of the calcaneal margin and hematoma with capsular entry and release was appreciated.  Portions of the interosseous ligament were released.  Thorough irrigation lavage the joint was conducted removing fragmentation and some comminution segments.  Then the fracture margins were explored as was recreatable from the 3-dimensional orchestration and interpretation of the CT scan.  The lateral articular fragment and the sustentacular fragment were reapproximated and with a wooden handled elevator put back up and into place underneath the talus with articulation alignment restored and height and joint alignment restored.  A Colindres elevator was utilized to dissect off the peroneal tendons and the lateral margin to allow for sinus Tarsi plate.  Temporization with 0.62 inch K wires was conducted.  This connected everything to the sustentacular fragment particularly first.  Then, the anterior calcaneal component was approximated and pinned into place with a temporary smooth K wire.  Then, the tuberosity was adjusted with a Schanz pin along with the Essex Lopresti maneuver.  This allowed anatomic restoration of the relationships of the calcaneal components.  Temporary fixation and alignment anatomically relation to height, width, articular relationship along with signs and Boehler's angle was confirmed with intraoperative fluoroscopic assistance.  Then, the lateral sinus Tarsi plate was applied first with compression screws across the articular margin of the posterior and then with locking and nonlocking screws about the periphery to align fragments to each other and to stabilize.  To stabilize the tuberosity fragment the sustentaculum 2 guidewires for 4.0 screws were placed.  A partially threaded screw along with a fully threaded screws were placed to allow for compression and maintenance of alignment along with height.  All final fixation was confirmed with Broden's views, AP views, lateral views and  axial views of the heel confirming anatomic alignment on this side.      Following this, thorough irrigation of the surgical sites was conducted.  Layered, anatomic closure completed with 2-0 Vicryl, 3-0 Vicryl and 3-0 nylon with careful apposition of the skin and surfaces for primary healing.  A compressive sterile splint/dressing was applied.  Vascular status was intact after deflation of the tourniquet.  SPLINT: A posterior fiberglass, below the knee splint was applied with the ankle in the neutral position.  COMPLICATIONS: No direct complications encountered throughout the case.    The patient tolerated the procedure & anesthesia well.  They were transported from the operative suite to the postoperative holding area.  The patient was given postoperative orders as well as specific postoperative instructions which were reviewed by the nursing staff.  Orders were placed for weightbearing status/activity, postoperative oral pain management, DVT prophylaxis measures both with mechanical and medicinal measures reviewed.  Splint/dressing care measures were reviewed as well as appropriate cryotherapy measures and nutrition.  Postoperative follow-up to be conducted in the next 10-14 days for outpatient clinical follow-up in the Orthopedic clinic at [facility].  If concerns or questions arise or develop they will contact our clinic and postoperative contact numbers provided.  Case details and post-operative care requirements reviewed with family/support present today.  Additionally, a detailed postoperative instruction sheet was provided to the patient and family.  All additional questions were answered postoperatively.    Please note that this report was completed with the assistance of voice recognition and transcription services.  Although every effort has been made to correct and avoid errors, errors may remain.    Dr. Nicolas Pacheco, REAL, FACFAS  Foot & Ankle Surgeon/Specialist  [facility]  Orthopedics          CC: [facility], Dr. Pacheco's Clinical Team

## 2019-01-01 NOTE — DISCHARGE INSTRUCTIONS
Same Day Surgery Discharge Instructions  Special Precautions After Surgery - Adult    1. It is not unusual to feel lightheaded or faint, up to 24 hours after surgery or while taking pain medication.  If you have these symptoms; sit for a few minutes before standing and have someone assist you when getting up.  2. You should rest and relax for the next 24 hours and must have someone stay with you for at least 24 hours after your discharge.  3. DO NOT DRIVE any vehicle or operate mechanical equipment for 24 hours following the end of your surgery.  DO NOT DRIVE while taking narcotic pain medications that have been prescribed by your physician.  If you had a limb operated on, you must be able to use it fully to drive.  4. DO NOT drink alcoholic beverages for 24 hours following surgery or while taking prescription pain medication.  5. Drink clear liquids (apple juice, ginger ale, broth, 7-Up, etc.).  Progress to your regular diet as you feel able.  6. Any questions call your physician and do not make important decisions for 24 hours.    ACTIVITY  ? Rest today.  No activity or diet restrictions.  ? Resume activity as tolerated.  ? Restrictions: per Dr Pacheco  ? See printed discharge sheet.     INCISIONAL CARE  ? Do not remove dressing until seen by physician.  ? Keep extremity elevated above the level of the heart if possible. .  ? Apply ice 1/2 hour on and 1/2 hour off while awake.  ? Be alert for signs of infection:  redness, swelling, heat, drainage of pus, and/or elevated temperature.  Contact your doctor if these occur.       Medications:  ? Acetaminophen & Oxycodone (Percocet):  Next dose: as needed.  ? Hydroxyzine (Vistaril):  Next dose: as needed.  ? Ibuprofen (Motrin, Advil):  Next dose: as needed.  ? Stool softeners to prevent constipation   ? ASA for blood clot prevention   ? Follow the instructions on the  bottle.  __________________________________________________________________________________________________________________________________  IMPORTANT NUMBERS:    [facility] Main Number:  [phone], [phone]  Pharmacy:  [phone]  Same Day Surgery:  [phone], Monday - Friday until 8:30 p.m.  Urgent Care:  [phone]  Emergency Room:  [phone] [place] [address] Orthopedics:  [phone]     Home Medical Equipment: [phone]  [facility]:  [phone]

## 2019-01-31 ENCOUNTER — TELEPHONE (OUTPATIENT)
Dept: FAMILY MEDICINE | Facility: CLINIC | Age: 54
End: 2019-01-31

## 2019-01-31 NOTE — TELEPHONE ENCOUNTER
Panel Management Review      Composite cancer screening  Chart review shows that this patient is due/due soon for the following Colonoscopy  Summary:    Patient is due/failing the following:   COLONOSCOPY    Action needed:   Patient needs referral/order: colonoscopy    Type of outreach:    Sent letter.    Questions for provider review:    None                                                                                                                                    Jahaira Rojas CMA

## 2019-01-31 NOTE — LETTER
January 31, 2019      Mario David  [address] [facility] [address]            At this time you are due for a Colon Cancer Screening. Here is some information regarding this testing.     Recommended every 5-10 years, depending on your history, in order to prevent and detect colon cancer at its earliest stages.  Colon cancer is now the second leading cause of death in the [place] for both men and women and there are over 130,000 new cases and 50,000 deaths per year from colon cancer.  Colonoscopies can prevent 90-95% of these deaths.  Problem lesions can be removed before they ever become cancer. This test is not only looking for cancer, but also getting rid of precancerious lesions. You are usually given some sedation which makes the test very comfortable for most people.      If you do not wish to do a colonoscopy or cannot afford to do one, at this time, there is another option. It is called a FIT test or Fecal Immunochemical Occult Blood Test (take home stool sample kit).  It does not replace the colonoscopy for colorectal cancer screening, but it can detect hidden bleeding in the lower colon.  It does need to be repeated every year and if a positive result is obtained, you would be referred for a colonoscopy. The FIT test is really easy to do and does not require any  diet or medication restrictions and involves only one collection sample.      If you have completed either one of these tests or had a flexible sigmoidoscopy in the past five years at another facility, please have the records sent to our clinic so that we can best coordinate your care and update your chart.  Please call us if you have questions or would like arrange either to do a colonoscopy or obtain the necessary test kit for the Fecal Occult Test.      Sincerely,        Jeovanny Hernandez MD

## 2019-02-12 ENCOUNTER — HOSPITAL ENCOUNTER (OUTPATIENT)
Dept: PHYSICAL THERAPY | Facility: CLINIC | Age: 54
Setting detail: THERAPIES SERIES
End: 2019-02-12
Attending: PODIATRIST
Payer: COMMERCIAL

## 2019-02-12 PROCEDURE — 97161 PT EVAL LOW COMPLEX 20 MIN: CPT | Mod: GP | Performed by: PHYSICAL THERAPIST

## 2019-02-12 PROCEDURE — 97116 GAIT TRAINING THERAPY: CPT | Mod: GP | Performed by: PHYSICAL THERAPIST

## 2019-02-12 PROCEDURE — 97110 THERAPEUTIC EXERCISES: CPT | Mod: GP | Performed by: PHYSICAL THERAPIST

## 2019-02-12 NOTE — PROGRESS NOTES
02/12/19 1500   General Information   Type of Visit Initial OP Ortho PT Evaluation   Start of Care Date 02/12/19   Referring Physician Dr. Nicolas Pacheco   Patient/Family Goals Statement To be back 100%;  walk normally   Orders Evaluate and Treat   Date of Order 02/07/19   Insurance Type [employer]   Medical Diagnosis s/p B ORIF calcaneaus fx   Body Part(s)   Body Part(s) Ankle/Foot   Presentation and Etiology   Pertinent history of current problem (include personal factors and/or comorbidities that impact the POC) Pt states on 12/16/18 he was on top of his RV he lost his balance falling onto frozen ground and landed on B feet.  Pt fx B calcaneus and had ORIF on 12/31/18.  Pt was non wt bearing thru 2/7/19.  Pt states he has a walker but did not bring it in.  Pain R heel  @ best 2/10,  @ worst 7/10,   L heel @ best 2/10,  @ worst 6/10.   Pt reports he had xrays and is healing as expected.  Meds: advil / aspirin/  tylenol.  PMHX:  unremarkable.   Moderate: job tasks   Impairments A. Pain;B. Decreased WB tolerance;C. Swelling;D. Decreased ROM;E. Decreased flexibility;F. Decreased strength and endurance;G. Impaired balance;H. Impaired gait   Onset date of current episode/exacerbation 12/16/18   Pain quality C. Aching   Pain exacerbation comment immediate w/ wt bearing. Walking tolerance 100 yards.   Stairs marked time w/ rail (uses L as good leg).     Pain/symptoms eased by C. Rest   Progression of symptoms since onset: Improved   Prior Level of Function   Functional Level Prior Comment Pt goes camping, running w/ kids,  yardwork   Current Level of Function   Patient role/employment history A. Employed   Employment Comments  --flat beds---working in office currently.     Fall Risk Screen   Fall screen completed by PT   Have you fallen 2 or more times in the past year? No   Have you fallen and had an injury in the past year? No   Is patient a fall risk? No   Ankle/Foot Objective Findings   Side (if  bilateral, select both right and left) Right   Observation incisions healing as expected ,  mild scabbing remains L > R.     Gait/Locomotion Mild + to mod limp w/o device.  R trendelenberg   Ankle/Foot Strength Comments Fair + MMT w/ DF/ PF.  B hip flex/ quad / HS 5/5.  B hip abd 4/5.     Palpation Moderate tenderness B heels.    Right DF (Knee Ext) AROM DF  R 0*,  L lacking 5* from neutral   Right PF AROM R 25*,  L 23*   Right Calcanceal Inversion AROM 0* B   Right Calcaneal Eversion AROM 0* B   Planned Therapy Interventions   Planned Therapy Interventions manual therapy;ROM;strengthening;stretching;gait training   Clinical Impression   Criteria for Skilled Therapeutic Interventions Met yes, treatment indicated   PT Diagnosis s/p B calcaneus fx/ ORIF   Influenced by the following impairments pain, decreased ROM, decreased strength   Functional limitations due to impairments walking, stairs, work   Clinical Presentation Stable/Uncomplicated   Clinical Presentation Rationale progressing as expected following ORIF surgery   Clinical Decision Making (Complexity) Low complexity   Therapy Frequency 1 time/week   Predicted Duration of Therapy Intervention (days/wks) 8 weeks  (May need to increase to 2X/wk if not progressing as expected)   Risk & Benefits of therapy have been explained Yes   Patient, Family & other staff in agreement with plan of care Yes   Education Assessment   Barriers to Learning No barriers   Ortho Goal 1   Goal Description 1.  Pt will be able to walk w/o device w/ slight to no limp   Target Date 03/14/19   Ortho Goal 2   Goal Description 2.  Pt will be able to do stairs reciprocally using 1 railing with minimal difficulty   Target Date 04/13/19   Ortho Goal 3   Goal Description 3.  Pt will be able to walk X 20 min w/ minimal difficulty and pain no > 2/10   Target Date 04/13/19   Ortho Goal 4   Goal Description 4.  Pt will be independent and consistent w/HEP   Target Date 04/13/19   Total Evaluation  Time   PT Anirudh, Low Complexity Minutes (74088) 20     Thank you for this referral,    Sita Lozada, PT,  CEAS   #[id number]  [facility]  [phone]

## 2019-02-19 ENCOUNTER — HOSPITAL ENCOUNTER (OUTPATIENT)
Dept: PHYSICAL THERAPY | Facility: CLINIC | Age: 54
Setting detail: THERAPIES SERIES
End: 2019-02-19
Attending: PODIATRIST
Payer: COMMERCIAL

## 2019-02-19 PROCEDURE — 97110 THERAPEUTIC EXERCISES: CPT | Mod: GP | Performed by: PHYSICAL THERAPIST

## 2019-02-26 ENCOUNTER — HOSPITAL ENCOUNTER (OUTPATIENT)
Dept: PHYSICAL THERAPY | Facility: CLINIC | Age: 54
Setting detail: THERAPIES SERIES
End: 2019-02-26
Attending: PODIATRIST
Payer: COMMERCIAL

## 2019-02-26 PROCEDURE — 97110 THERAPEUTIC EXERCISES: CPT | Mod: GP | Performed by: PHYSICAL THERAPIST

## 2019-03-11 ENCOUNTER — HOSPITAL ENCOUNTER (OUTPATIENT)
Dept: PHYSICAL THERAPY | Facility: CLINIC | Age: 54
Setting detail: THERAPIES SERIES
End: 2019-03-11
Attending: PODIATRIST
Payer: COMMERCIAL

## 2019-03-11 PROCEDURE — 97110 THERAPEUTIC EXERCISES: CPT | Mod: GP | Performed by: PHYSICAL THERAPIST

## 2019-05-01 NOTE — PROGRESS NOTES
"   OUTPATIENT PHYSICAL THERAPY DISCHARGE SUMMARY   Dr. Nicolas Pacheco 2/12/19 to 03/11/19 1500   Signing Clinician's Name / Credentials   Signing clinician's name / credentials Sita Lozada, PT [id number]   Session Number   Session Number 4 [id number]   Ortho Goal 1   Goal Description 1.  Pt will be able to walk w/o device w/ slight to no limp.  3/11/19 mild to mild + limp w/o device   Target Date 03/14/19   Ortho Goal 2   Goal Description 2.  Pt will be able to do stairs reciprocally using 1 railing with minimal difficulty   Target Date 04/13/19   Ortho Goal 3   Goal Description 3.  Pt will be able to walk X 20 min w/ minimal difficulty and pain no > 2/10   Target Date 04/13/19   Ortho Goal 4   Goal Description 4.  Pt will be independent and consistent w/HEP   Target Date 04/13/19   Subjective Report   Subjective Report Pt notes pain level 2-3/10 B.  No complaints w/ exercise.     Objective Measures   Objective Measure mild to mild + limp w/o device.     Details AROM DF R 4*,  L 0*  PF R 35*,  L 35*   Therapeutic Procedure/exercise   Patient Response Ankle AROM , SLR, hip abd , bridges on own.    Treatment Detail Scifit seat 9 L 3 X 3 min.   Standing blue rocker board 2 foot w/ UE support  DF/PF X 20,  Circles X 10 each  B.   Gastroc stretch on incline board x 1 min.  6\" fwd step up x 10 B w/ UE support.    Standing  gastroc stretch 30\"  B.     Ankle DF GTB  X 20 B .  GTB ankle PF X 20 B. Ankle inversion isometric w/ ball x 10 B.  isometric eversion X 10 B.   Plan   Home program ex as above   Plan  Pt failed to show for appt on 3/18/19 and did not reschedule.  Discharge from physical therapy as patient has not been seen in past 30 days.       Comments   Comments Limited comments on goals as patient did not return.       "

## 2019-05-01 NOTE — ADDENDUM NOTE
Encounter addended by: Sita Lozada, PT on: 5/1/2019 9:44 AM   Actions taken: Sign clinical note, Flowsheet accepted, Episode resolved

## 2019-05-14 ENCOUNTER — TELEPHONE (OUTPATIENT)
Dept: FAMILY MEDICINE | Facility: CLINIC | Age: 54
End: 2019-05-14

## 2019-05-14 NOTE — LETTER
May 14, 2019      Mario David  [address] [facility] [place] [address]          At this time you are due for a Colon Cancer Screening. Here is some information regarding this testing.     Recommended every 5-10 years, depending on your history, in order to prevent and detect colon cancer at its earliest stages.  Colon cancer is now the second leading cause of death in the [place] for both men and women and there are over 130,000 new cases and 50,000 deaths per year from colon cancer.  Colonoscopies can prevent 90-95% of these deaths.  Problem lesions can be removed before they ever become cancer. This test is not only looking for cancer, but also getting rid of precancerious lesions. You are usually given some sedation which makes the test very comfortable for most people.      If you do not wish to do a colonoscopy or cannot afford to do one, at this time, there is another option. It is called a FIT test or Fecal Immunochemical Occult Blood Test (take home stool sample kit).  It does not replace the colonoscopy for colorectal cancer screening, but it can detect hidden bleeding in the lower colon.  It does need to be repeated every year and if a positive result is obtained, you would be referred for a colonoscopy. The FIT test is really easy to do and does not require any  diet or medication restrictions and involves only one collection sample.      If you have completed either one of these tests or had a flexible sigmoidoscopy in the past five years at another facility, please have the records sent to our clinic so that we can best coordinate your care and update your chart.  Please call us if you have questions or would like arrange either to do a colonoscopy or obtain the necessary test kit for the Fecal Occult Test.    Sincerely,        Jeovanny Hernandez MD

## 2019-11-07 ENCOUNTER — ANESTHESIA EVENT (OUTPATIENT)
Dept: GASTROENTEROLOGY | Facility: CLINIC | Age: 54
End: 2019-11-07
Payer: COMMERCIAL

## 2019-11-07 ASSESSMENT — LIFESTYLE VARIABLES: TOBACCO_USE: 1

## 2019-11-07 NOTE — ANESTHESIA PREPROCEDURE EVALUATION
Anesthesia Pre-Procedure Evaluation    Patient: Mario David   MRN: [id number] : 1965          Preoperative Diagnosis: * No pre-op diagnosis entered *    Procedure(s):  COLONOSCOPY    Past Medical History:   Diagnosis Date     Tobacco dependence      Past Surgical History:   Procedure Laterality Date     NO HISTORY OF SURGERY       OPEN REDUCTION INTERNAL FIXATION CALCANEOUS Bilateral 2018    Procedure: OPEN REDUCTION INTERNAL FIXATION of Bilateral CALCANEOUS Fractures;  Surgeon: Nicolas Pacheco DPM;  Location: [facility] OR       Anesthesia Evaluation     . Pt has had prior anesthetic. Type: General           ROS/MED HX    ENT/Pulmonary:     (+)tobacco use, Current use , . .    Neurologic:  - neg neurologic ROS     Cardiovascular:  - neg cardiovascular ROS       METS/Exercise Tolerance:     Hematologic:  - neg hematologic  ROS       Musculoskeletal: Comment: Hx of bilateral calcaneal  fractures        GI/Hepatic:  - neg GI/hepatic ROS       Renal/Genitourinary:  - ROS Renal section negative       Endo:  - neg endo ROS       Psychiatric:  - neg psychiatric ROS       Infectious Disease:  - neg infectious disease ROS       Malignancy:      - no malignancy   Other:    - neg other ROS                      Physical Exam  Normal systems: cardiovascular, pulmonary and dental    Airway   Mallampati: I  TM distance: >3 FB  Neck ROM: full    Dental     Cardiovascular       Pulmonary             Lab Results   Component Value Date    WBC 10.5 2018    HGB 13.8 2018    HCT 41.4 2018     2018     2018    POTASSIUM 4.0 2018    CHLORIDE 104 2018    CO2 26 2018    BUN 11 2018    CR 0.82 2018     (H) 2018    LAURIE 8.1 (L) 2018    ALBUMIN 4.1 2011    PROTTOTAL 7.0 2011    ALT 7 2011    AST 22 2011    ALKPHOS 72 2011    BILITOTAL 0.6 2011       Preop Vitals  BP Readings from Last 3 Encounters:   18  "108/80   12/18/18 106/62   10/15/18 114/76    Pulse Readings from Last 3 Encounters:   12/31/18 82   12/18/18 80   10/15/18 83      Resp Readings from Last 3 Encounters:   12/31/18 (!) 6   12/18/18 16    SpO2 Readings from Last 3 Encounters:   12/31/18 94%   12/18/18 96%   10/15/18 98%      Temp Readings from Last 1 Encounters:   12/31/18 36.6  C (97.8  F) (Oral)    Ht Readings from Last 1 Encounters:   12/31/18 1.727 m (5' 8\")      Wt Readings from Last 1 Encounters:   12/31/18 72.6 kg (160 lb)    Estimated body mass index is 24.33 kg/m  as calculated from the following:    Height as of 12/31/18: 1.727 m (5' 8\").    Weight as of 12/31/18: 72.6 kg (160 lb).       Anesthesia Plan      History & Physical Review  History and physical reviewed and following examination; no interval change.    ASA Status:  2 .    NPO Status:  > 6 hours    Plan for General and MAC with Propofol induction. Maintenance will be TIVA.  Reason for MAC:  Deep or markedly invasive procedure (G8)  PONV prophylaxis:  Ondansetron (or other 5HT-3) and Dexamethasone or Solumedrol       Postoperative Care  Postoperative pain management:  Multi-modal analgesia.      Consents  Anesthetic plan, risks, benefits and alternatives discussed with:  Patient..                 CHING Moser CRNA  "

## 2019-11-08 ENCOUNTER — HOSPITAL ENCOUNTER (OUTPATIENT)
Facility: CLINIC | Age: 54
Discharge: HOME | End: 2019-11-08
Attending: SURGERY | Admitting: SURGERY
Payer: COMMERCIAL

## 2019-11-08 ENCOUNTER — HEALTH MAINTENANCE LETTER (OUTPATIENT)
Age: 54
End: 2019-11-08

## 2019-11-08 ENCOUNTER — ANESTHESIA (OUTPATIENT)
Dept: GASTROENTEROLOGY | Facility: CLINIC | Age: 54
End: 2019-11-08
Payer: COMMERCIAL

## 2019-11-08 VITALS
OXYGEN SATURATION: 100 % | RESPIRATION RATE: 16 BRPM | SYSTOLIC BLOOD PRESSURE: 100 MMHG | TEMPERATURE: 98 F | WEIGHT: 160 LBS | DIASTOLIC BLOOD PRESSURE: 68 MMHG | HEIGHT: 68 IN | BODY MASS INDEX: 24.25 KG/M2 | HEART RATE: 66 BPM

## 2019-11-08 LAB — COLONOSCOPY: NORMAL

## 2019-11-08 PROCEDURE — 37000008 ZZH ANESTHESIA TECHNICAL FEE, 1ST 30 MIN: Performed by: SURGERY

## 2019-11-08 PROCEDURE — 88305 TISSUE EXAM BY PATHOLOGIST: CPT | Mod: 26 | Performed by: SURGERY

## 2019-11-08 PROCEDURE — 25800030 ZZH RX IP 258 OP 636: Performed by: SURGERY

## 2019-11-08 PROCEDURE — 88305 TISSUE EXAM BY PATHOLOGIST: CPT | Performed by: SURGERY

## 2019-11-08 PROCEDURE — 25000128 H RX IP 250 OP 636: Performed by: NURSE ANESTHETIST, CERTIFIED REGISTERED

## 2019-11-08 PROCEDURE — 45385 COLONOSCOPY W/LESION REMOVAL: CPT | Mod: PT | Performed by: SURGERY

## 2019-11-08 PROCEDURE — 25000125 ZZHC RX 250: Performed by: NURSE ANESTHETIST, CERTIFIED REGISTERED

## 2019-11-08 RX ORDER — ONDANSETRON 2 MG/ML
4 INJECTION INTRAMUSCULAR; INTRAVENOUS
Status: DISCONTINUED | OUTPATIENT
Start: 2019-11-08 | End: 2019-11-08 | Stop reason: HOSPADM

## 2019-11-08 RX ORDER — LIDOCAINE HYDROCHLORIDE 10 MG/ML
INJECTION, SOLUTION INFILTRATION; PERINEURAL PRN
Status: DISCONTINUED | OUTPATIENT
Start: 2019-11-08 | End: 2019-11-08

## 2019-11-08 RX ORDER — NALOXONE HYDROCHLORIDE 0.4 MG/ML
.1-.4 INJECTION, SOLUTION INTRAMUSCULAR; INTRAVENOUS; SUBCUTANEOUS
Status: DISCONTINUED | OUTPATIENT
Start: 2019-11-08 | End: 2019-11-08 | Stop reason: HOSPADM

## 2019-11-08 RX ORDER — SODIUM CHLORIDE, SODIUM LACTATE, POTASSIUM CHLORIDE, CALCIUM CHLORIDE 600; 310; 30; 20 MG/100ML; MG/100ML; MG/100ML; MG/100ML
INJECTION, SOLUTION INTRAVENOUS CONTINUOUS
Status: DISCONTINUED | OUTPATIENT
Start: 2019-11-08 | End: 2019-11-08 | Stop reason: HOSPADM

## 2019-11-08 RX ORDER — LIDOCAINE 40 MG/G
CREAM TOPICAL
Status: DISCONTINUED | OUTPATIENT
Start: 2019-11-08 | End: 2019-11-08 | Stop reason: HOSPADM

## 2019-11-08 RX ORDER — PROPOFOL 10 MG/ML
INJECTION, EMULSION INTRAVENOUS PRN
Status: DISCONTINUED | OUTPATIENT
Start: 2019-11-08 | End: 2019-11-08

## 2019-11-08 RX ORDER — PROPOFOL 10 MG/ML
INJECTION, EMULSION INTRAVENOUS CONTINUOUS PRN
Status: DISCONTINUED | OUTPATIENT
Start: 2019-11-08 | End: 2019-11-08

## 2019-11-08 RX ORDER — GLYCOPYRROLATE 0.2 MG/ML
INJECTION, SOLUTION INTRAMUSCULAR; INTRAVENOUS PRN
Status: DISCONTINUED | OUTPATIENT
Start: 2019-11-08 | End: 2019-11-08

## 2019-11-08 RX ORDER — FLUMAZENIL 0.1 MG/ML
0.2 INJECTION, SOLUTION INTRAVENOUS
Status: DISCONTINUED | OUTPATIENT
Start: 2019-11-08 | End: 2019-11-08 | Stop reason: HOSPADM

## 2019-11-08 RX ADMIN — SODIUM CHLORIDE, POTASSIUM CHLORIDE, SODIUM LACTATE AND CALCIUM CHLORIDE: 600; 310; 30; 20 INJECTION, SOLUTION INTRAVENOUS at 08:58

## 2019-11-08 RX ADMIN — PROPOFOL 200 MCG/KG/MIN: 10 INJECTION, EMULSION INTRAVENOUS at 09:39

## 2019-11-08 RX ADMIN — GLYCOPYRROLATE 0.2 MG: 0.2 INJECTION, SOLUTION INTRAMUSCULAR; INTRAVENOUS at 09:39

## 2019-11-08 RX ADMIN — LIDOCAINE HYDROCHLORIDE 50 MG: 10 INJECTION, SOLUTION INFILTRATION; PERINEURAL at 09:39

## 2019-11-08 RX ADMIN — PROPOFOL 100 MG: 10 INJECTION, EMULSION INTRAVENOUS at 09:39

## 2019-11-08 ASSESSMENT — MIFFLIN-ST. JEOR: SCORE: 1535.26

## 2019-11-08 NOTE — H&P
"54 year old year old male here for colonoscopy for screening.  This is patient's first colonoscopy.  There is a questionable history of polyps in his father.  Patient denies blood in stool or change in stool caliber.    Patient Active Problem List   Diagnosis     CARDIOVASCULAR SCREENING; LDL GOAL LESS THAN 130     Tobacco abuse     Closed bilateral calcaneal fractures       Past Medical History:   Diagnosis Date     Tobacco dependence        Past Surgical History:   Procedure Laterality Date     NO HISTORY OF SURGERY       OPEN REDUCTION INTERNAL FIXATION CALCANEOUS Bilateral 12/31/2018    Procedure: OPEN REDUCTION INTERNAL FIXATION of Bilateral CALCANEOUS Fractures;  Surgeon: Nicolas Pacheco DPM;  Location: [facility] OR       Family History   Problem Relation Age of Onset     No Known Problems Mother      No Known Problems Father        No current outpatient medications on file.       Allergies   Allergen Reactions     Nka [No Known Allergies]        Pt reports that he has been smoking cigarettes. He has a 30.00 pack-year smoking history. He has never used smokeless tobacco. He reports current alcohol use. He reports that he does not use drugs.    Exam:  Temp 98  F (36.7  C) (Oral)   Resp 16   Ht 1.727 m (5' 8\")   Wt 72.6 kg (160 lb)   BMI 24.33 kg/m      Awake, Alert OX3  Lungs - CTA bilaterally  CV - RRR, no murmurs, distal pulses intact  Abd - soft, non-distended, non-tender, +BS  Extr - No cyanosis or edema    A/P 54 year old year old male in need of colonoscopy for screening. Risks, benefits, alternatives, and complications were discussed including the possibility of perforation, bleeding, missed lesion and the patient agreed to proceed    Chris Covington DO on 11/8/2019 at 9:11 AM    "

## 2019-11-08 NOTE — ANESTHESIA CARE TRANSFER NOTE
Patient: Mario David    Procedure(s):  COLONOSCOPY, FLEXIBLE, WITH LESION REMOVAL USING SNARE & CLIP APPLICATION    Diagnosis: * No pre-op diagnosis entered *  Diagnosis Additional Information: No value filed.    Anesthesia Type:   General, MAC     Note:  Airway :Nasal Cannula  Patient transferred to:Phase II  Handoff Report: Identifed the Patient, Identified the Reponsible Provider, Reviewed the pertinent medical history, Discussed the surgical course, Reviewed Intra-OP anesthesia mangement and issues during anesthesia, Set expectations for post-procedure period and Allowed opportunity for questions and acknowledgement of understanding      Vitals: (Last set prior to Anesthesia Care Transfer)    CRNA VITALS  11/8/2019 0926 - 11/8/2019 0958      11/8/2019             Pulse:  73    Ht Rate:  73    SpO2:  97 %                Electronically Signed By: CHING Conti CRNA  November 8, 2019  9:58 AM

## 2019-11-08 NOTE — ANESTHESIA POSTPROCEDURE EVALUATION
Patient: Mario David    Procedure(s):  COLONOSCOPY, FLEXIBLE, WITH LESION REMOVAL USING SNARE & CLIP APPLICATION    Diagnosis:* No pre-op diagnosis entered *  Diagnosis Additional Information: No value filed.    Anesthesia Type:  General, MAC    Note:  Anesthesia Post Evaluation    Patient location during evaluation: Phase 2  Patient participation: Able to fully participate in evaluation  Level of consciousness: awake and alert  Pain management: adequate  Airway patency: patent  Cardiovascular status: acceptable and hemodynamically stable  Respiratory status: acceptable and room air  Hydration status: acceptable  PONV: none     Anesthetic complications: None          Last vitals:  Vitals:    11/08/19 0903 11/08/19 1000   BP: 96/62 91/57   Pulse: 88 65   Resp: 16 16   Temp: 36.7  C (98  F)    SpO2: 99%          Electronically Signed By: CHING Conti CRNA  November 8, 2019  10:17 AM

## 2019-11-15 LAB — COPATH REPORT: NORMAL

## 2019-11-19 PROBLEM — D12.6 ADENOMATOUS COLON POLYP: Status: ACTIVE | Noted: 2019-11-19

## 2020-02-17 NOTE — BRIEF OP NOTE
[facility] OR   Brief Operative Note    Pre-operative diagnosis: bilateral calcaneus fractures   Post-operative diagnosis * No post-op diagnosis entered *   Procedure: Procedure(s):  OPEN REDUCTION INTERNAL FIXATION of Bilateral CALCANEOUS Fractures   Surgeon: Nicolas Pacheco DPM   Anesthesia: Combined General with Popliteal Block    Estimated blood loss: Less than 10 ml   Blood transfusion: No transfusion was given during surgery   Drains: None   Specimens: None   Findings:  Bilateral articular displaced calcaneus fractures consistent with Reeves type II fractures with reasonable/adequate bone stock.  Articular involvement with cartilage damage primarily about the fracture lines.  Restoration of anatomic height, width and articular margins bilaterally was able to be obtained.   Complications: None   Condition: Stable   Comments: See dictated operative report for full details.           Nicolas Pacheco DPM, FACFAS  Foot & Ankle Surgeon/Specialist  [facility]     Speaking Coherently

## 2020-02-23 ENCOUNTER — HEALTH MAINTENANCE LETTER (OUTPATIENT)
Age: 55
End: 2020-02-23

## 2020-11-27 ENCOUNTER — OFFICE VISIT (OUTPATIENT)
Dept: FAMILY MEDICINE | Facility: CLINIC | Age: 55
End: 2020-11-27
Payer: COMMERCIAL

## 2020-11-27 VITALS
OXYGEN SATURATION: 97 % | SYSTOLIC BLOOD PRESSURE: 102 MMHG | DIASTOLIC BLOOD PRESSURE: 68 MMHG | WEIGHT: 157.8 LBS | HEIGHT: 68 IN | TEMPERATURE: 97.6 F | BODY MASS INDEX: 23.92 KG/M2 | RESPIRATION RATE: 16 BRPM | HEART RATE: 82 BPM

## 2020-11-27 DIAGNOSIS — Z23 NEED FOR VACCINATION: ICD-10-CM

## 2020-11-27 DIAGNOSIS — Z23 NEED FOR PROPHYLACTIC VACCINATION AND INOCULATION AGAINST INFLUENZA: ICD-10-CM

## 2020-11-27 DIAGNOSIS — Z00.00 ROUTINE HISTORY AND PHYSICAL EXAMINATION OF ADULT: Primary | ICD-10-CM

## 2020-11-27 DIAGNOSIS — Z23 ENCOUNTER FOR IMMUNIZATION: ICD-10-CM

## 2020-11-27 LAB
CHOLEST SERPL-MCNC: 213 MG/DL
HBA1C MFR BLD: 5.2 % (ref 0–5.6)
HDLC SERPL-MCNC: 74 MG/DL
LDLC SERPL CALC-MCNC: 109 MG/DL
NONHDLC SERPL-MCNC: 139 MG/DL
PSA SERPL-ACNC: 0.62 UG/L (ref 0–4)
TRIGL SERPL-MCNC: 148 MG/DL

## 2020-11-27 PROCEDURE — 90682 RIV4 VACC RECOMBINANT DNA IM: CPT | Performed by: NURSE PRACTITIONER

## 2020-11-27 PROCEDURE — 90472 IMMUNIZATION ADMIN EACH ADD: CPT | Performed by: NURSE PRACTITIONER

## 2020-11-27 PROCEDURE — G0103 PSA SCREENING: HCPCS | Performed by: NURSE PRACTITIONER

## 2020-11-27 PROCEDURE — 36415 COLL VENOUS BLD VENIPUNCTURE: CPT | Performed by: NURSE PRACTITIONER

## 2020-11-27 PROCEDURE — 83036 HEMOGLOBIN GLYCOSYLATED A1C: CPT | Performed by: NURSE PRACTITIONER

## 2020-11-27 PROCEDURE — 99396 PREV VISIT EST AGE 40-64: CPT | Mod: 25 | Performed by: NURSE PRACTITIONER

## 2020-11-27 PROCEDURE — 90471 IMMUNIZATION ADMIN: CPT | Performed by: NURSE PRACTITIONER

## 2020-11-27 PROCEDURE — 90715 TDAP VACCINE 7 YRS/> IM: CPT | Performed by: NURSE PRACTITIONER

## 2020-11-27 PROCEDURE — 80061 LIPID PANEL: CPT | Performed by: NURSE PRACTITIONER

## 2020-11-27 ASSESSMENT — MIFFLIN-ST. JEOR: SCORE: 1520.28

## 2020-11-27 NOTE — PATIENT INSTRUCTIONS
1. Colonoscopy due 2022 (3yr follow-up from previous completed in 2019)  3. Will check cholesterol, hemoglobin A1c to screen for diabetes  4. Check PSA level for prostate cancer screening.  5. Tetanus booster today.    Consider:  1. Lung Cancer Screening- low-dose chest CT (imaging) can be done anytime due to smoking history.   2. Shingles vaccine - call your insurance company to see where this is cheapest      Preventive Health Recommendations  Male Ages 50 - 64    Yearly exam:             See your health care provider every year in order to  o   Review health changes.   o   Discuss preventive care.    o   Review your medicines if your doctor has prescribed any.     Have a cholesterol test every 5 years, or more frequently if you are at risk for high cholesterol/heart disease.     Have a diabetes test (fasting glucose) every three years. If you are at risk for diabetes, you should have this test more often.     Have a colonoscopy at age 50, or have a yearly FIT test (stool test). These exams will check for colon cancer.      Talk with your health care provider about whether or not a prostate cancer screening test (PSA) is right for you.    You should be tested each year for STDs (sexually transmitted diseases), if you re at risk.     Shots: Get a flu shot each year. Get a tetanus shot every 10 years.     Nutrition:    Eat at least 5 servings of fruits and vegetables daily.     Eat whole-grain bread, whole-wheat pasta and brown rice instead of white grains and rice.     Get adequate Calcium and Vitamin D.     Lifestyle    Exercise for at least 150 minutes a week (30 minutes a day, 5 days a week). This will help you control your weight and prevent disease.     Limit alcohol to one drink per day.     No smoking.     Wear sunscreen to prevent skin cancer.     See your dentist every six months for an exam and cleaning.     See your eye doctor every 1 to 2 years.    Preventive Health Recommendations  Male Ages 50 -  64    Yearly exam:             See your health care provider every year in order to  o   Review health changes.   o   Discuss preventive care.    o   Review your medicines if your doctor has prescribed any.     Have a cholesterol test every 5 years, or more frequently if you are at risk for high cholesterol/heart disease.     Have a diabetes test (fasting glucose) every three years. If you are at risk for diabetes, you should have this test more often.     Have a colonoscopy at age 50, or have a yearly FIT test (stool test). These exams will check for colon cancer.      Talk with your health care provider about whether or not a prostate cancer screening test (PSA) is right for you.    You should be tested each year for STDs (sexually transmitted diseases), if you re at risk.     Shots: Get a flu shot each year. Get a tetanus shot every 10 years.     Nutrition:    Eat at least 5 servings of fruits and vegetables daily.     Eat whole-grain bread, whole-wheat pasta and brown rice instead of white grains and rice.     Get adequate Calcium and Vitamin D.     Lifestyle    Exercise for at least 150 minutes a week (30 minutes a day, 5 days a week). This will help you control your weight and prevent disease.     Limit alcohol to one drink per day.     No smoking.     Wear sunscreen to prevent skin cancer.     See your dentist every six months for an exam and cleaning.     See your eye doctor every 1 to 2 years.

## 2020-11-27 NOTE — PROGRESS NOTES
3  SUBJECTIVE:   CC: Mario Daivd is an 55 year old male who presents for preventive health visit.     Healthy Habits:    Do you get at least three servings of calcium containing foods daily (dairy, green leafy vegetables, etc.)? yes    Amount of exercise or daily activities, outside of work: 7 day(s) per week    Problems taking medications regularly not applicable    Medication side effects: No    Have you had an eye exam in the past two years? yes    Do you see a dentist twice per year? yes    Do you have sleep apnea, excessive snoring or daytime drowsiness?no    Today's PHQ-2 Score:   PHQ-2 ( 1999 [employer]) 11/27/2020 10/15/2018   Q1: Little interest or pleasure in doing things 0 0   Q2: Feeling down, depressed or hopeless 0 0   PHQ-2 Score 0 0     Abuse: Current or Past(Physical, Sexual or Emotional)- No  Do you feel safe in your environment? Yes    Have you ever done Advance Care Planning? (For example, a Health Directive, POLST, or a discussion with a medical provider or your loved ones about your wishes): No, advance care planning information given to patient to review.  Patient plans to discuss their wishes with loved ones or provider.      Social History     Tobacco Use     Smoking status: Current Every Day Smoker     Packs/day: 0.75     Years: 30.00     Pack years: 22.50     Types: Cigarettes     Smokeless tobacco: Never Used   Substance Use Topics     Alcohol use: Yes     Comment: 2 drinks on the weekend     If you drink alcohol do you typically have >3 drinks per day or >7 drinks per week? No                      Last PSA:   PSA   Date Value Ref Range Status   06/22/2011 0.96 0 - 4 ug/L Final       Reviewed orders with patient. Reviewed health maintenance and updated orders accordingly - Yes.   Colonoscopy last 2019- repeat in 3 years. Due 2022.  Lung Cancer Screening: Patient declines LDCT at this time  PSA: Ordered today      Reviewed and updated as needed this visit by clinical staff  Tobacco   "Allergies  Meds  Problems  Med Hx  Surg Hx  Fam Hx  Soc Hx        Reviewed and updated as needed this visit by Provider  Tobacco  Allergies  Meds  Problems  Med Hx  Surg Hx  Fam Hx         Past Medical History:   Diagnosis Date     Tobacco dependence       Past Surgical History:   Procedure Laterality Date     NO HISTORY OF SURGERY       OPEN REDUCTION INTERNAL FIXATION CALCANEOUS Bilateral 12/31/2018    Procedure: OPEN REDUCTION INTERNAL FIXATION of Bilateral CALCANEOUS Fractures;  Surgeon: Nicolas Pacheco DPM;  Location: [facility] OR     ROS:  CONSTITUTIONAL: NEGATIVE for fever, chills, change in weight  INTEGUMENTARY/SKIN: NEGATIVE for worrisome rashes, moles or lesions  EYES: NEGATIVE for vision changes or irritation  ENT: NEGATIVE for ear, mouth and throat problems  RESP: NEGATIVE for significant cough or SOB  CV: NEGATIVE for chest pain, palpitations or peripheral edema  GI: NEGATIVE for nausea, abdominal pain, heartburn, or change in bowel habits   male: negative for dysuria, hematuria, decreased urinary stream, erectile dysfunction, urethral discharge  MUSCULOSKELETAL: NEGATIVE for significant arthralgias or myalgia  NEURO: NEGATIVE for weakness, dizziness or paresthesias  ENDOCRINE: NEGATIVE for temperature intolerance, skin/hair changes  HEME/ALLERGY/IMMUNE: NEGATIVE for bleeding problems  PSYCHIATRIC: NEGATIVE for changes in mood or affect    OBJECTIVE:   /68   Pulse 82   Temp 97.6  F (36.4  C) (Tympanic)   Resp 16   Ht 1.727 m (5' 8\")   Wt 71.6 kg (157 lb 12.8 oz)   SpO2 97%   BMI 23.99 kg/m    EXAM:  GENERAL: healthy, alert and no distress  EYES: Eyes grossly normal to inspection, PERRL and conjunctivae and sclerae normal  HENT: ear canals and TM's normal, nose and mouth without ulcers or lesions  NECK: no adenopathy, no asymmetry, masses, or scars and thyroid normal to palpation  RESP: lungs clear to auscultation - no rales, rhonchi or wheezes  CV: regular rate and rhythm, " "normal S1 S2, no S3 or S4, no murmur, click or rub, no peripheral edema and peripheral pulses strong, no carotid bruits  ABDOMEN: soft, nontender, no hepatosplenomegaly, no masses and bowel sounds normal  : Deferred  MS: no gross musculoskeletal defects noted, no edema  SKIN: no suspicious lesions or rashes  NEURO: Normal strength and tone, mentation intact and speech normal  PSYCH: mentation appears normal, affect normal/bright    ASSESSMENT/PLAN:   1. Routine history and physical examination of adult  Overall healthy 56 yo male. Denies concerns today related to health. PE negative for significant findings. Is not interested in smoking cessation today. Offered LDCT scan for lung cnacer screening-declined. Will re-check lipid panel today as this has been elevated in the past. Patient understand if elevated he will need to return for fasting lipids. HgbA1c today. PSA screen today.   - Lipid panel reflex to direct LDL Non-fasting  - PSA, screen  - Hemoglobin A1c    2. Encounter for immunization  - C RIV4 (FLUBLOK) VACCINE RECOMBINANT DNA PRSRV ANTIBIO FREE, IM [[id number]]  -tetanus booster administered today    COUNSELING:  Reviewed preventive health counseling, as reflected in patient instructions  Special attention given to:        Regular exercise       Colon cancer screening       Prostate cancer screening       Consider lung cancer screening for ages 55-80 years and 30 pack-year smoking history       Advance Care Planning    Estimated body mass index is 23.99 kg/m  as calculated from the following:    Height as of this encounter: 1.727 m (5' 8\").    Weight as of this encounter: 71.6 kg (157 lb 12.8 oz).    He reports that he has been smoking cigarettes. He has a 22.50 pack-year smoking history. He has never used smokeless tobacco.  Tobacco Cessation Action Plan:   Information offered: Patient not interested at this time    Franca Espinoza NP  [facility]  "

## 2020-11-27 NOTE — LETTER
November 30, 2020      Mario David  [address] [place] [id number]        Dear ,    We are writing to inform you of your test results.    {results letter list:[id number]}    Resulted Orders   Lipid panel reflex to direct LDL Non-fasting   Result Value Ref Range    Cholesterol 213 (H) <200 mg/dL      Comment:      Desirable:       <200 mg/dl    Triglycerides 148 <150 mg/dL    HDL Cholesterol 74 >39 mg/dL    LDL Cholesterol Calculated 109 (H) <100 mg/dL      Comment:      Above desirable:  100-129 mg/dl  Borderline High:  130-159 mg/dL  High:             160-189 mg/dL  Very high:       >189 mg/dl      Non HDL Cholesterol 139 (H) <130 mg/dL      Comment:      Above Desirable:  130-159 mg/dl  Borderline high:  160-189 mg/dl  High:             190-219 mg/dl  Very high:       >219 mg/dl     PSA, screen   Result Value Ref Range    PSA 0.62 0 - 4 ug/L      Comment:      Assay Method:  Chemiluminescence using Siemens Vista analyzer   Hemoglobin A1c   Result Value Ref Range    Hemoglobin A1C 5.2 0 - 5.6 %      Comment:      Normal <5.7% Prediabetes 5.7-6.4%  Diabetes 6.5% or higher - adopted from ADA   consensus guidelines.         If you have any questions or concerns, please call the clinic at the number listed above.       Sincerely,        Franca Espinoza NP

## 2020-11-27 NOTE — LETTER
November 30, 2020      Mario David  [address] [place] [phone]        Dear ,    We are writing to inform you of your test results.    Your cholesterol is still mildly high but improving.  Continue with diet control and follow-up in 1 year for recheck on lipids.  Your PSA was also normal.     Resulted Orders   Lipid panel reflex to direct LDL Non-fasting   Result Value Ref Range    Cholesterol 213 (H) <200 mg/dL      Comment:      Desirable:       <200 mg/dl    Triglycerides 148 <150 mg/dL    HDL Cholesterol 74 >39 mg/dL    LDL Cholesterol Calculated 109 (H) <100 mg/dL      Comment:      Above desirable:  100-129 mg/dl  Borderline High:  130-159 mg/dL  High:             160-189 mg/dL  Very high:       >189 mg/dl      Non HDL Cholesterol 139 (H) <130 mg/dL      Comment:      Above Desirable:  130-159 mg/dl  Borderline high:  160-189 mg/dl  High:             190-219 mg/dl  Very high:       >219 mg/dl     PSA, screen   Result Value Ref Range    PSA 0.62 0 - 4 ug/L      Comment:      Assay Method:  Chemiluminescence using Siemens Vista analyzer   Hemoglobin A1c   Result Value Ref Range    Hemoglobin A1C 5.2 0 - 5.6 %      Comment:      Normal <5.7% Prediabetes 5.7-6.4%  Diabetes 6.5% or higher - adopted from ADA   consensus guidelines.         If you have any questions or concerns, please call the clinic at the number listed above.       Sincerely,        Franca Espinoza NP

## 2021-09-25 ENCOUNTER — HEALTH MAINTENANCE LETTER (OUTPATIENT)
Age: 56
End: 2021-09-25

## 2022-01-15 ENCOUNTER — HEALTH MAINTENANCE LETTER (OUTPATIENT)
Age: 57
End: 2022-01-15

## 2023-01-07 ENCOUNTER — HEALTH MAINTENANCE LETTER (OUTPATIENT)
Age: 58
End: 2023-01-07

## 2023-04-22 ENCOUNTER — HEALTH MAINTENANCE LETTER (OUTPATIENT)
Age: 58
End: 2023-04-22

## 2024-04-30 ENCOUNTER — OFFICE VISIT (OUTPATIENT)
Dept: FAMILY MEDICINE | Facility: CLINIC | Age: 59
End: 2024-04-30

## 2024-04-30 VITALS
TEMPERATURE: 96.9 F | DIASTOLIC BLOOD PRESSURE: 88 MMHG | BODY MASS INDEX: 25.16 KG/M2 | HEART RATE: 77 BPM | OXYGEN SATURATION: 95 % | HEIGHT: 68 IN | WEIGHT: 166 LBS | RESPIRATION RATE: 16 BRPM | SYSTOLIC BLOOD PRESSURE: 134 MMHG

## 2024-04-30 DIAGNOSIS — Z12.11 SCREENING FOR MALIGNANT NEOPLASM OF COLON: ICD-10-CM

## 2024-04-30 DIAGNOSIS — H60.391 INFECTIVE OTITIS EXTERNA, RIGHT: ICD-10-CM

## 2024-04-30 DIAGNOSIS — H65.93 OME (OTITIS MEDIA WITH EFFUSION), BILATERAL: Primary | ICD-10-CM

## 2024-04-30 PROCEDURE — 99203 OFFICE O/P NEW LOW 30 MIN: CPT | Performed by: NURSE PRACTITIONER

## 2024-04-30 RX ORDER — AMOXICILLIN 875 MG
875 TABLET ORAL 2 TIMES DAILY
Qty: 14 TABLET | Refills: 0 | Status: SHIPPED | OUTPATIENT
Start: 2024-04-30 | End: 2024-05-03

## 2024-04-30 RX ORDER — CIPROFLOXACIN AND DEXAMETHASONE 3; 1 MG/ML; MG/ML
4 SUSPENSION/ DROPS AURICULAR (OTIC) 2 TIMES DAILY
Qty: 7.5 ML | Refills: 0 | Status: SHIPPED | OUTPATIENT
Start: 2024-04-30 | End: 2024-05-07

## 2024-04-30 ASSESSMENT — PATIENT HEALTH QUESTIONNAIRE - PHQ9
10. IF YOU CHECKED OFF ANY PROBLEMS, HOW DIFFICULT HAVE THESE PROBLEMS MADE IT FOR YOU TO DO YOUR WORK, TAKE CARE OF THINGS AT HOME, OR GET ALONG WITH OTHER PEOPLE: NOT DIFFICULT AT ALL
SUM OF ALL RESPONSES TO PHQ QUESTIONS 1-9: 0
SUM OF ALL RESPONSES TO PHQ QUESTIONS 1-9: 0

## 2024-04-30 NOTE — PROGRESS NOTES
"  Assessment & Plan     OME (otitis media with effusion), bilateral  Bilateral ears with purulent effusion, unable to definitively determine if TM perforation is present. Appears acutely infected, antibiotic sent.   - amoxicillin (AMOXIL) 875 MG tablet     Infective otitis externa, right  Unable to visualize right TM, accumulation of purulent dried exudate, canal erythematous. Antibiotic sent, recommend utilizing cotton balls in ears while showering to avoid water from entering ear. Instructed to remain sidelying for several minutes after instilling drops to ensure penetration.  - ciprofloxacin-dexAMETHasone (CIPRODEX) 0.3-0.1 % otic suspension           Nicotine/Tobacco Cessation  He reports that he has been smoking cigarettes. He has a 22.5 pack-year smoking history. He has never used smokeless tobacco.  Nicotine/Tobacco Cessation Plan  Information offered: Patient not interested at this time      BMI  Estimated body mass index is 25.24 kg/m  as calculated from the following:    Height as of this encounter: 1.727 m (5' 8\").    Weight as of this encounter: 75.3 kg (166 lb).     Provider Addendum  I performed the history and physical examination of the patient and discussed the management with the student. I have reviewed the patients past medical history, past surgical history, current medications, current allergies, family history and social history. I reviewed the available lab and imaging studies. I reviewed the student's note and agree with the documented findings and plan of care. I have reviewed all diagnostics noted and performed the medical decision making. Changes were made in the body of the note to achieve one comprehensive document.    CHING Nunez CNP [facility]      Noman Martin is a 58 year old, presenting for the following health issues:  Ear Problem        4/30/2024     7:14 AM   Additional Questions   Roomed by Carmina ADAM     History of Present Illness " "      Reason for visit:  Bilateral ear pain  Symptom onset:  More than a month  Symptoms include:  Ear  Symptom intensity:  Moderate  Symptom progression:  Staying the same  Had these symptoms before:  No    He eats 0-1 servings of fruits and vegetables daily.He consumes 1 sweetened beverage(s) daily.He exercises with enough effort to increase his heart rate 10 to 19 minutes per day.  He exercises with enough effort to increase his heart rate 3 or less days per week.   He is taking medications regularly.     Began with left ear, now has moved to the right with both ears affected. No hx of allergies, thinks he may have trouble with cerumen however never required a flush. Denies ear ringing. Experiencing fullness, feels they need to pop, notes reduced hearing and can hear himself chewing. Tried to use a bulb to clean ears out, ineffective.  Decreased ability to hear.    Review of Systems  CONSTITUTIONAL: NEGATIVE for fever, chills, change in weight  ENT/MOUTH: NEGATIVE for ear, mouth and throat problems, discharge from left ear, and ear pain bilateral      Objective    /88   Pulse 77   Temp 96.9  F (36.1  C) (Tympanic)   Resp 16   Ht 1.727 m (5' 8\")   Wt 75.3 kg (166 lb)   SpO2 95%   BMI 25.24 kg/m    Body mass index is 25.24 kg/m .  Physical Exam   GENERAL: alert and no distress  HENT: normal cephalic/atraumatic, right ear: mucopurulent effusion and purulent drainage in canal, left ear: purulent drainage in canal, nose and mouth without ulcers or lesions, oropharynx clear, and oral mucous membranes moist  NECK: no adenopathy, no asymmetry, masses, or scars  RESP: lungs clear to auscultation - no rales, rhonchi or wheezes  CV: regular rate and rhythm, normal S1 S2, no S3 or S4, no murmur, click or rub, no peripheral edema          Irma Bceker, TRAVIS Student, [facility]  Signed Electronically by: CHING Hurtado CNP  "

## 2024-05-02 ENCOUNTER — MYC MEDICAL ADVICE (OUTPATIENT)
Dept: FAMILY MEDICINE | Facility: CLINIC | Age: 59
End: 2024-05-02
Payer: COMMERCIAL

## 2024-05-02 DIAGNOSIS — H65.93 OME (OTITIS MEDIA WITH EFFUSION), BILATERAL: Primary | ICD-10-CM

## 2024-05-03 RX ORDER — AZITHROMYCIN 250 MG/1
TABLET, FILM COATED ORAL
Qty: 6 TABLET | Refills: 0 | Status: SHIPPED | OUTPATIENT
Start: 2024-05-03 | End: 2024-05-08

## 2024-05-03 NOTE — TELEPHONE ENCOUNTER
Please see my chart messages    Reaction to Amoxicillin for OME    Patient stopped medication.     Substitute?    Sara Cedillo RN on 5/3/2024 at 10:05 AM

## 2024-06-29 ENCOUNTER — HEALTH MAINTENANCE LETTER (OUTPATIENT)
Age: 59
End: 2024-06-29

## 2024-08-17 ENCOUNTER — E-VISIT (OUTPATIENT)
Dept: URGENT CARE | Facility: CLINIC | Age: 59
End: 2024-08-17
Payer: COMMERCIAL

## 2024-08-17 DIAGNOSIS — R21 RASH AND NONSPECIFIC SKIN ERUPTION: Primary | ICD-10-CM

## 2024-08-17 PROCEDURE — 99207 PR NON-BILLABLE SERV PER CHARTING: CPT | Performed by: INTERNAL MEDICINE

## 2024-08-17 NOTE — PATIENT INSTRUCTIONS
Dear Mario David,    We are sorry you are not feeling well. Based on the responses you provided, it is recommended that you be seen in-person in urgent care so we can better evaluate your symptoms, as your symptoms could be due to an infection that needs specific treatment.  Please click here to find the nearest urgent care location to you.   You will not be charged for this Visit. Thank you for trusting us with your care.    Caren Phillip MD

## 2024-08-19 ENCOUNTER — HOSPITAL ENCOUNTER (EMERGENCY)
Facility: CLINIC | Age: 59
Discharge: HOME | End: 2024-08-19
Attending: PHYSICIAN ASSISTANT | Admitting: PHYSICIAN ASSISTANT
Payer: COMMERCIAL

## 2024-08-19 VITALS
RESPIRATION RATE: 18 BRPM | DIASTOLIC BLOOD PRESSURE: 75 MMHG | OXYGEN SATURATION: 97 % | HEART RATE: 79 BPM | TEMPERATURE: 98.4 F | SYSTOLIC BLOOD PRESSURE: 113 MMHG

## 2024-08-19 DIAGNOSIS — H60.391 INFECTIVE OTITIS EXTERNA, RIGHT: ICD-10-CM

## 2024-08-19 DIAGNOSIS — B99.9 LOCALIZED INFECTION: ICD-10-CM

## 2024-08-19 PROCEDURE — 99213 OFFICE O/P EST LOW 20 MIN: CPT | Performed by: PHYSICIAN ASSISTANT

## 2024-08-19 PROCEDURE — G0463 HOSPITAL OUTPT CLINIC VISIT: HCPCS | Performed by: PHYSICIAN ASSISTANT

## 2024-08-19 RX ORDER — CIPROFLOXACIN AND DEXAMETHASONE 3; 1 MG/ML; MG/ML
4 SUSPENSION/ DROPS AURICULAR (OTIC) 2 TIMES DAILY
Qty: 7.5 ML | Refills: 0 | Status: SHIPPED | OUTPATIENT
Start: 2024-08-19

## 2024-08-19 RX ORDER — CEPHALEXIN 500 MG/1
500 CAPSULE ORAL 4 TIMES DAILY
Qty: 28 CAPSULE | Refills: 0 | Status: SHIPPED | OUTPATIENT
Start: 2024-08-19 | End: 2024-08-26

## 2024-08-19 ASSESSMENT — COLUMBIA-SUICIDE SEVERITY RATING SCALE - C-SSRS
1. IN THE PAST MONTH, HAVE YOU WISHED YOU WERE DEAD OR WISHED YOU COULD GO TO SLEEP AND NOT WAKE UP?: NO
2. HAVE YOU ACTUALLY HAD ANY THOUGHTS OF KILLING YOURSELF IN THE PAST MONTH?: NO
6. HAVE YOU EVER DONE ANYTHING, STARTED TO DO ANYTHING, OR PREPARED TO DO ANYTHING TO END YOUR LIFE?: NO

## 2024-08-19 ASSESSMENT — ACTIVITIES OF DAILY LIVING (ADL): ADLS_ACUITY_SCORE: 35

## 2024-08-19 NOTE — ED PROVIDER NOTES
History     Chief Complaint   Patient presents with    Derm Problem     Sore on left side of face and right upper arm. Painful. Open. Allergic to horseflies. So could've been bit. Possibly oozing . Has been about 1 week. Can get itch. Has had a different one in past 2 weeks.      Ear Problem     Right ear. Can't hear out of it. Was seen in April for left ear problem that was the same and given ear drops that helped.      HPI  Mario David is a 58 year old male who presents to urgent care with concern over possible skin over ear infection.  Patient reports that he initially developed lesion on the left side of his face more than 10 days ago.  He states that that lesion did have a small amount of discharge present he subsequently developed an additional lesion on the left side of his face and 2 lesions in his right axilla.  He states they are they are tender to palpation, erythematous, mildly swollen.  He denies any associated fever, nasal congestion, cough, dyspnea, wheezing, vomiting, diarrhea or abdominal complaints.  He denies any prior history of MRSA.    He has a second chief complaint of decreased hearing from his right ear which is also been present for at least approximately the last week. He does have some associated discomfort.  No significant discharge or drainage from the ear.  He had similar symptoms in the summer diagnosed with otitis externa, bilateral otitis media treated with drops which he reported dramatic improvement requesting same drops again.      allergies:  Allergies   Allergen Reactions    Amoxicillin Swelling     Around eyes       Problem List:    Patient Active Problem List    Diagnosis Date Noted    Adenomatous colon polyp 11/19/2019     Priority: Medium    Closed bilateral calcaneal fractures 12/16/2018     Priority: Medium    Tobacco abuse 06/22/2011     Priority: Medium    CARDIOVASCULAR SCREENING; LDL GOAL LESS THAN 130 10/31/2010     Priority: Medium        Past Medical History:     Past Medical History:   Diagnosis Date    Tobacco dependence        Past Surgical History:    Past Surgical History:   Procedure Laterality Date    NO HISTORY OF SURGERY      OPEN REDUCTION INTERNAL FIXATION CALCANEOUS Bilateral 12/31/2018    Procedure: OPEN REDUCTION INTERNAL FIXATION of Bilateral CALCANEOUS Fractures;  Surgeon: Nicolas Pacheco DPM;  Location: [facility] OR       Family History:    Family History   Problem Relation Age of Onset    No Known Problems Mother     No Known Problems Father        Social History:  Marital Status:   [2]  Social History     Tobacco Use    Smoking status: Every Day     Current packs/day: 0.75     Average packs/day: 0.8 packs/day for 30.0 years (22.5 ttl pk-yrs)     Types: Cigarettes    Smokeless tobacco: Never   Substance Use Topics    Alcohol use: Yes     Comment: 2 drinks on the weekend    Drug use: No        Medications:    cephALEXin (KEFLEX) 500 MG capsule  ciprofloxacin-dexAMETHasone (CIPRODEX) 0.3-0.1 % otic suspension      Review of Systems  CONSTITUTIONAL:NEGATIVE for fever, chills, change in weight  INTEGUMENTARY/SKIN: POSITIVE for rash/skin lesions   EYES: NEGATIVE for vision changes or irritation  ENT/MOUTH:  POSITIVE for right ear pain NEGATIVE for sore throat, nasal congestion   RESP:NEGATIVE for significant cough or SOB  GI: NEGATIVE for vomiting, diarrhea, abdominal pain   Physical Exam   BP: 113/75  Pulse: 79  Temp: 98.4  F (36.9  C)  Resp: 18  SpO2: 97 %  Physical Exam  GENERAL APPEARANCE: healthy, alert and no distress  EYES: EOMI,  PERRL, conjunctiva clear  HENT: External right ear is tender to palpation, small amount of canal swelling.  No significant erythema, discharge, left TM and canal WNL.  Nose and mouth without ulcers, erythema or lesions  NECK: supple, nontender, no lymphadenopathy  RESP: lungs clear to auscultation - no rales, rhonchi or wheezes  CV: regular rates and rhythm, normal S1 S2, no murmur noted  SKIN: oozing pustule to left side  of face lateral and inferior to left eye, firm erythematous tender swollen pea size mass to right axillary 2 cm tender erythematous mass to right upper back just posterior to axilla  ED Course        Procedures       Critical Care time:  none          No results found for this or any previous visit (from the past 24 hour(s)).  Medications - No data to display    Assessments & Plan (with Medical Decision Making)     I have reviewed the nursing notes.  I have reviewed the findings, diagnosis, plan and need for follow up with the patient.       New Prescriptions    CEPHALEXIN (KEFLEX) 500 MG CAPSULE    Take 1 capsule (500 mg) by mouth 4 times daily for 7 days    CIPROFLOXACIN-DEXAMETHASONE (CIPRODEX) 0.3-0.1 % OTIC SUSPENSION    Place 4 drops into the right ear 2 times daily     Final diagnoses:   Localized infection   Infective otitis externa, right     58-year-old male presents urgent care with 2 chief complaints the first of which is rash/skin lesions that have been present/progressing over the last 10 days as well as right ear pain for the last week.  He had stable vital signs upon arrival.  Physical exam findings significant for draining pustule to the left lateral aspect of the face near the eye, 2 small tender localized erythematous swollen lesions to the right axilla and right back concerning for developing abscess, no evidence of cellulitis at this time.  I did discuss for/benefits of trial of incision and drainage and patient elected to defer instead opting for symptomatic treatment with warm moist compresses, Tylenol/ibuprofen, close follow-up if no improvement within the next 3 to 4 days, may require I&D at that time.  He did have some changes in his ear consistent with otitis externa.  He was discharged home stable with prescription for Keflex, Ciprodex.  Follow-up as directed if no improvement within the next 72 hours.  Worrisome reasons to return to the ER/UC sooner discussed.    Disclaimer: This note  consists of symbols derived from keyboarding, dictation, and/or voice recognition software. As a result, there may be errors in the script that have gone undetected.  Please consider this when interpreting information found in the chart.    8/19/2024   [facility] EMERGENCY DEPT       Ju York PA-C  08/21/24 1955

## 2024-10-14 ENCOUNTER — HOSPITAL ENCOUNTER (EMERGENCY)
Facility: CLINIC | Age: 59
Discharge: HOME | End: 2024-10-14
Attending: PHYSICIAN ASSISTANT | Admitting: PHYSICIAN ASSISTANT
Payer: COMMERCIAL

## 2024-10-14 VITALS
DIASTOLIC BLOOD PRESSURE: 83 MMHG | OXYGEN SATURATION: 98 % | RESPIRATION RATE: 18 BRPM | TEMPERATURE: 97.8 F | SYSTOLIC BLOOD PRESSURE: 126 MMHG | HEART RATE: 88 BPM

## 2024-10-14 DIAGNOSIS — L73.9 FOLLICULITIS: ICD-10-CM

## 2024-10-14 PROCEDURE — G0463 HOSPITAL OUTPT CLINIC VISIT: HCPCS | Performed by: PHYSICIAN ASSISTANT

## 2024-10-14 PROCEDURE — 87070 CULTURE OTHR SPECIMN AEROBIC: CPT | Performed by: PHYSICIAN ASSISTANT

## 2024-10-14 PROCEDURE — 99213 OFFICE O/P EST LOW 20 MIN: CPT | Performed by: PHYSICIAN ASSISTANT

## 2024-10-14 RX ORDER — MUPIROCIN 20 MG/G
OINTMENT TOPICAL 3 TIMES DAILY
Qty: 16 G | Refills: 0 | Status: SHIPPED | OUTPATIENT
Start: 2024-10-14

## 2024-10-14 RX ORDER — SULFAMETHOXAZOLE AND TRIMETHOPRIM 800; 160 MG/1; MG/1
1 TABLET ORAL 2 TIMES DAILY
Qty: 20 TABLET | Refills: 0 | Status: SHIPPED | OUTPATIENT
Start: 2024-10-14 | End: 2024-10-24

## 2024-10-14 ASSESSMENT — ACTIVITIES OF DAILY LIVING (ADL): ADLS_ACUITY_SCORE: 35

## 2024-10-14 ASSESSMENT — COLUMBIA-SUICIDE SEVERITY RATING SCALE - C-SSRS
1. IN THE PAST MONTH, HAVE YOU WISHED YOU WERE DEAD OR WISHED YOU COULD GO TO SLEEP AND NOT WAKE UP?: NO
6. HAVE YOU EVER DONE ANYTHING, STARTED TO DO ANYTHING, OR PREPARED TO DO ANYTHING TO END YOUR LIFE?: NO
2. HAVE YOU ACTUALLY HAD ANY THOUGHTS OF KILLING YOURSELF IN THE PAST MONTH?: NO

## 2024-10-14 NOTE — ED PROVIDER NOTES
History     Chief Complaint   Patient presents with    Rash     HPI  Mario David is a 59 year old male who presents to urgent care with concern over rash/possible skin infection.  Patient has ongoing erythematous pustules and  swelling that pop up in various locations throughout his body.  Currently has tender swollen area on the dorsal aspect of his mid left forearm.  Had lesion on his right lower leg which resulted in ulceration approximately 2 weeks ago.  He was seen in urgent care  for similar lesions on the left side of his face, right axilla as well as ear pain 8/19/24.  He was discharged home with prescription for Ciprodex, Keflex at that time.  States that symptoms did resolve however have recurred and evolved.He has not had any associated fever, chills, myalgias, sore throat, cough, dyspnea or wheezing, nausea, vomiting, abdominal complaints.  No known history of MRSA.  He states concerned that rash could be secondary to hot tub use which he recently began using for the first time this year.      Allergies:  Allergies   Allergen Reactions    Amoxicillin Swelling     Around eyes     Problem List:    Patient Active Problem List    Diagnosis Date Noted    Adenomatous colon polyp 11/19/2019     Priority: Medium    Closed bilateral calcaneal fractures 12/16/2018     Priority: Medium    Tobacco abuse 06/22/2011     Priority: Medium    CARDIOVASCULAR SCREENING; LDL GOAL LESS THAN 130 10/31/2010     Priority: Medium      Past Medical History:    Past Medical History:   Diagnosis Date    Tobacco dependence      Past Surgical History:    Past Surgical History:   Procedure Laterality Date    NO HISTORY OF SURGERY      OPEN REDUCTION INTERNAL FIXATION CALCANEOUS Bilateral 12/31/2018    Procedure: OPEN REDUCTION INTERNAL FIXATION of Bilateral CALCANEOUS Fractures;  Surgeon: Nicolas Pacheco DPM;  Location: [facility] OR     Family History:    Family History   Problem Relation Age of Onset    No Known Problems Mother      No Known Problems Father        Social History:  Marital Status:   [2]  Social History     Tobacco Use    Smoking status: Every Day     Current packs/day: 0.75     Average packs/day: 0.8 packs/day for 30.0 years (22.5 ttl pk-yrs)     Types: Cigarettes    Smokeless tobacco: Never   Substance Use Topics    Alcohol use: Yes     Comment: 2 drinks on the weekend    Drug use: No      Medications:    mupirocin (BACTROBAN) 2 % external ointment  sulfamethoxazole-trimethoprim (BACTRIM DS) 800-160 MG tablet  ciprofloxacin-dexAMETHasone (CIPRODEX) 0.3-0.1 % otic suspension      Review of Systems  CONSTITUTIONAL:NEGATIVE for fever, chills, change in weight  INTEGUMENTARY/SKIN: POSITIVE for rash/skin lesions   EYES: NEGATIVE for vision changes or irritation  ENT/MOUTH: NEGATIVE for ear, mouth and throat problems  RESP:NEGATIVE for significant cough or SOB  GI: NEGATIVE for nausea, abdominal pain, heartburn, or change in bowel habits  MS: POSITIVE for discomfort left forearm at site of skin changes.  NEGATIVE for other concerning arthralgias or myalgias    Physical Exam   BP: 126/83  Pulse: 88  Temp: 97.8  F (36.6  C)  Resp: 18  SpO2: 98 %  Physical Exam  GENERAL APPEARANCE: alert, cooperative and no acute distress  EYES: EOMI,  PERRL, conjunctiva clear  NECK: supple, nontender, no lymphadenopathy  RESP: lungs clear to auscultation - no rales, rhonchi or wheezes  CV: regular rates and rhythm, normal S1 S2, no murmur noted  SKIN: 1 cm ulceration to the medial aspect of his mid right lower leg.  He has a 0.5 cm wide pustular lesion to the dorsal aspect of the right forearm with additional 2 to 3 cm of surrounding erythema, induration, no significant warmth.    ED Course        Procedures       Critical Care time:  none       Results for orders placed or performed during the hospital encounter of 10/14/24   Wound Aerobic Bacterial Culture Routine Without Gram Stain     Status: Abnormal (Preliminary result)    Specimen: Arm,  Left; Wound   Result Value Ref Range    Culture 4+ Staphylococcus aureus (A)      Medications - No data to display    Assessments & Plan (with Medical Decision Making)     I have reviewed the nursing notes.  I have reviewed the findings, diagnosis, plan and need for follow up with the patient.       New Prescriptions    MUPIROCIN (BACTROBAN) 2 % EXTERNAL OINTMENT    Apply topically 3 times daily.    SULFAMETHOXAZOLE-TRIMETHOPRIM (BACTRIM DS) 800-160 MG TABLET    Take 1 tablet by mouth 2 times daily for 10 days.     Final diagnoses:   Folliculitis     59-year-old male presents to urgent care with concern over ongoing intermittent rash currently with lesion on his left forearm is present for last several days.  Physical exam findings significant for pustular lesion with additional 2 to 3 cm of surrounding erythema, induration.  No significant warmth.  Given patient's reported history of numerous similar symptoms symptoms are consistent with folliculitis, would consider developing abscess on the left forearm.  Does not appear classic for cellulitis however this also would remain in the differential.  After discussing versus benefits patient agreed to proceed with incision with 18-gauge needle, purulent material obtained was sent for culture.  He was discharged home stable with prescription for Bactrim, Bactroban ointment. Follow up with PCP if no improvement in 3 days. Worrisome reasons to return to ER/UC sooner discussed.     Disclaimer: This note consists of symbols derived from keyboarding, dictation, and/or voice recognition software. As a result, there may be errors in the script that have gone undetected.  Please consider this when interpreting information found in the chart.      10/14/2024   [facility] EMERGENCY DEPT       Ju York PA-C  10/16/24 [id number]

## 2024-10-14 NOTE — ED TRIAGE NOTES
LT elbow lesion that looks infected is wondering if his hot tub is the cause of these infections.  Tamar Mendez MA

## 2024-10-17 ENCOUNTER — TELEPHONE (OUTPATIENT)
Dept: NURSING | Facility: CLINIC | Age: 59
End: 2024-10-17
Payer: COMMERCIAL

## 2024-10-17 LAB — BACTERIA WND CULT: ABNORMAL

## 2024-10-17 NOTE — TELEPHONE ENCOUNTER
[facility]    Reason for call: Lab Result Notification     Lab Result (including Rx patient on, if applicable).  If culture, copy of lab report at bottom.  Lab Result: Wound Aerobic Bacterial Culture Routine Without Gram Stain   Arm, Left; Wound   10/14/24 Prescribed Mupirocin (BACTROBAN) 2 % external ointment Apply topically 3 times daily. - Topical   10/14/24 Prescribed Sulfamethoxazole-trimethoprim (BACTRIM DS) 800-160 MG tablet Take 1 tablet by mouth 2 times daily for 10 days. - Oral   10/14/24 Incision and Drainage    Creatinine Level (mg/dl)   Creatinine   Date Value Ref Range Status   12/18/2018 0.82 0.66 - 1.25 mg/dL Final    Creatinine clearance (ml/min), if applicable    Creatinine clearance cannot be calculated (Patient's most recent lab result is older than the maximum 365 days allowed.)     RN Recommendations/Instructions per [place] [place] ED lab result protocol:   [facility] ED lab result protocol utilized: Culture    Patient's current Symptoms at time of telephone encounter:   Pt states left arm is more sore than when seen on 10/14/24, pt can hardly raise his arm, pt states more swelling, no drainage, pt can't express any drainage.  Pt states no fever.  No nausea or vomiting.  Pt states redness at the wound is the same as on 10/14/24.  Pt states he is a  over the road and won't be back home until tomorrow evening and when he returns will be seen.    Patient/care giver notified to contact your PCP clinic or return to the Emergency department if your:  Symptoms return.  Symptoms do not improve after 3 days on antibiotic.  Symptoms do not resolve after completing antibiotic.  Symptoms worsen or other concerning symptoms.       Olive Youssef RN

## 2024-12-06 ENCOUNTER — OFFICE VISIT (OUTPATIENT)
Dept: FAMILY MEDICINE | Facility: CLINIC | Age: 59
End: 2024-12-06
Payer: COMMERCIAL

## 2024-12-06 VITALS
OXYGEN SATURATION: 97 % | HEART RATE: 91 BPM | SYSTOLIC BLOOD PRESSURE: 118 MMHG | BODY MASS INDEX: 24.36 KG/M2 | RESPIRATION RATE: 16 BRPM | TEMPERATURE: 97.9 F | WEIGHT: 160.2 LBS | DIASTOLIC BLOOD PRESSURE: 78 MMHG

## 2024-12-06 DIAGNOSIS — Z28.20 VACCINE REFUSED BY PATIENT: ICD-10-CM

## 2024-12-06 DIAGNOSIS — L30.9 DERMATITIS: Primary | ICD-10-CM

## 2024-12-06 DIAGNOSIS — H65.91 RIGHT NON-SUPPURATIVE OTITIS MEDIA: ICD-10-CM

## 2024-12-06 DIAGNOSIS — H93.90 LESION OF EAR: ICD-10-CM

## 2024-12-06 PROCEDURE — 99214 OFFICE O/P EST MOD 30 MIN: CPT | Performed by: FAMILY MEDICINE

## 2024-12-06 RX ORDER — CEPHALEXIN 500 MG/1
500 CAPSULE ORAL 3 TIMES DAILY
Qty: 21 CAPSULE | Refills: 0 | Status: SHIPPED | OUTPATIENT
Start: 2024-12-06 | End: 2024-12-13

## 2024-12-06 RX ORDER — TRIAMCINOLONE ACETONIDE 1 MG/G
CREAM TOPICAL 2 TIMES DAILY
Qty: 45 G | Refills: 0 | Status: SHIPPED | OUTPATIENT
Start: 2024-12-06

## 2024-12-06 NOTE — PROGRESS NOTES
"  Assessment & Plan     (L30.9) Dermatitis  (primary encounter diagnosis)  Comment: I think this is likely eczematous. Discussed diagnosis and treatment. He wants derm referral as well and this was given. All questions answered - instructions for treatment given.   Plan: triamcinolone (KENALOG) 0.1 % external cream,         Adult Dermatology  Referral            (H93.90) Lesion of ear  Comment: I am concerned about cholesteotoma of the right ear. Asked for ENT to take a look   Plan: Adult ENT  Referral            (H65.91) Right non-suppurative otitis media  Comment: ok to try treating for infection as well. Discussed risks/benefits/side effects with the patient.  The patient indicates understanding of these issues and agrees with the plan.   Plan: cephALEXin (KEFLEX) 500 MG capsule            (Z28.20) Vaccine refused by patient  Comment: he refuses all screenings and vaccinations   Plan:     BMI  Estimated body mass index is 24.36 kg/m  as calculated from the following:    Height as of 4/30/24: 1.727 m (5' 8\").    Weight as of this encounter: 72.7 kg (160 lb 3.2 oz).         Noman Martin is a 59 year old, presenting for the following health issues:  Derm Problem        12/6/2024    11:12 AM   Additional Questions   Roomed by MIKE López   Accompanied by Self      History of Present Illness       Reason for visit:  Skin rash  Symptom onset:  3-4 weeks ago   He is taking medications regularly.     Chronic, re-occurring symptoms.  Feels he may need to see a dermatologist.     ED/UC Followup:  Facility:  [facility] Emergency Dept  Date of visit: 10/14/24  Reason for visit: skin infection of multiple sites (arm, back, side, leg) and right ear pain.   Current Status: Symptoms never fully resolved for derm issue or his ear pain, worsening again since completing treatments from October ED visit.    On 10/14/24 : Bactrim and mupirocin given   Cx - staph aureus  He feels he was getting " better but now symptoms have returned again     Ongoing :  Scattered red lesions on right lower leg  Right chest wall  Wrist and hands     Shower - daily   Ivory soap all over   Hot tub at home     Right ear feels plugged   Ongoing issue  He says ciprodex was helping but ran out last month     Review of Systems  Constitutional, HEENT, cardiovascular, pulmonary, gi and gu systems are negative, except as otherwise noted.        Objective    /78   Pulse 91   Temp 97.9  F (36.6  C) (Tympanic)   Resp 16   Wt 72.7 kg (160 lb 3.2 oz)   SpO2 97%   BMI 24.36 kg/m    Body mass index is 24.36 kg/m .  Physical Exam   GENERAL: alert and no distress  EYES: Eyes grossly normal to inspection, PERRL and conjunctivae and sclerae normal  HENT: right ear: normal canal. Ear drum itself with heaped up appearing layer of white material on drum, drum also appears retracted left ear: normal: no effusions, no erythema, normal landmarks, nose and mouth without ulcers or lesions, oropharynx clear, and oral mucous membranes moist  SKIN: scattered scabs and sore on body, in clusters. Skin is dry and he is scratching quite a bit           Signed Electronically by: Viktoria Mata MD

## 2024-12-06 NOTE — PATIENT INSTRUCTIONS
Discussed short,cool showers. Pat dry and apply emollient all over ( list of emollients given to pt). Apply steroid to rash bid for 5-7 days. Continue to use daily emollient to prevent recurrence.      Emollients - thick lotions    cetaphil - tub - BEST ONE, in my opinion   vanicream  eucerin  lubriderm  vaseline

## 2024-12-18 ENCOUNTER — TELEPHONE (OUTPATIENT)
Dept: OTOLARYNGOLOGY | Facility: CLINIC | Age: 59
End: 2024-12-18
Payer: COMMERCIAL

## 2024-12-18 NOTE — TELEPHONE ENCOUNTER
Pt returned phone call. Appointments were rescheduled.   Marilou Loza RN on 12/18/2024 at 11:17 AM

## 2024-12-18 NOTE — TELEPHONE ENCOUNTER
Left message for pt to call back. [facility] scheduled this patient's audiogram after his appointment with Dr. Christopher when it needs to be before. There is an ENT Audio available this afternoon if he is able to make it/if it's still available when he calls back. Otherwise, he will need to reschedule his appointment with Dr. Christopher for sometime after the audiogram.  Marilou Loza RN on 12/18/2024 at 9:01 AM

## 2025-01-16 ENCOUNTER — OFFICE VISIT (OUTPATIENT)
Dept: AUDIOLOGY | Facility: CLINIC | Age: 60
End: 2025-01-16
Payer: COMMERCIAL

## 2025-01-16 DIAGNOSIS — H90.6 MIXED HEARING LOSS, BILATERAL: Primary | ICD-10-CM

## 2025-01-16 NOTE — PROGRESS NOTES
AUDIOLOGY REPORT     SUMMARY: Audiology visit completed. See audiogram for results.       RECOMMENDATIONS: Follow-up with ENT.    Ryanne Perales, [facility]  [place] Licensed Audiologist #[id number]

## 2025-01-20 ENCOUNTER — OFFICE VISIT (OUTPATIENT)
Dept: OTOLARYNGOLOGY | Facility: CLINIC | Age: 60
End: 2025-01-20
Attending: FAMILY MEDICINE
Payer: COMMERCIAL

## 2025-01-20 DIAGNOSIS — H93.90 LESION OF EAR: ICD-10-CM

## 2025-01-20 DIAGNOSIS — H73.11 MYRINGITIS, CHRONIC, RIGHT: Primary | ICD-10-CM

## 2025-01-20 DIAGNOSIS — H73.893: ICD-10-CM

## 2025-01-20 PROCEDURE — 99203 OFFICE O/P NEW LOW 30 MIN: CPT | Mod: 25 | Performed by: OTOLARYNGOLOGY

## 2025-01-20 PROCEDURE — 69399 UNLISTED PX EXTERNAL EAR: CPT | Performed by: OTOLARYNGOLOGY

## 2025-01-20 RX ORDER — SULFACETAMIDE SODIUM AND PREDNISOLONE SODIUM PHOSPHATE 100; 2.3 MG/ML; MG/ML
SOLUTION/ DROPS OPHTHALMIC
Qty: 5 ML | Refills: 1 | Status: SHIPPED | OUTPATIENT
Start: 2025-01-20

## 2025-01-20 RX ORDER — CLOBETASOL PROPIONATE 0.5 MG/G
OINTMENT TOPICAL
COMMUNITY
Start: 2025-01-09

## 2025-01-20 NOTE — LETTER
1/20/2025      Mario David  [address] [place] [id number]      Dear Colleague,    Thank you for referring your patient, Mario David, to the [facility]. Please see a copy of my visit note below.    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 Audio done 1/16. Ciprodex rx'd 4/30/24 for otitis externa, right then again in August. Thinks may have gotten hot tub water into ear. Ears won't pop, lots of ringing.          HISTORY OF PRESENT ILLNESS    Mario was seen at the behest of Viktoria Mata MD for ear concern.    AUDIOLOGY NOTE:      HISTORY: New to Dr. Christopher with appt 01/20/2024. Referred by PCP due to lesion of ear, with concern for possible cholesteatoma right. Pt has been treated for otitis externa and OME repeatedly since April. No prior audios. Pt reports that he initially had OM just in the right ear but that now it is both ears and still hasn't cleared. Feels his ears won't pop, has bilat aural fullness, bilat difficulty hearing. Has a family history of age-related hearing loss in his parents. Denies otalgia, otorrhea, dizziness, prior ear surgery, noise exposure. RESULTS: Otoscopy: Clear canals, abnormal TMs bilat. Tymps: Reduced complaince right, flat left with normal volume measurements. Reflexes: Right ipsi elevated, left ipsi absent at 1000 Hz (DNT contra due to equipment). Audio: Right normal hearing sloping to mild SNHL, with an apparent conductive component at 1000 Hz. Left mod conductive hearing loss rising to normal hearing sloping to moderate mixed loss. REC: To ENT as planned. Recheck per ENT.          REVIEW OF SYSTEMS    Review of Systems as per HPI and PMHx, otherwise 10 system review system are negative.       ALLERGIES    Amoxicillin    CURRENT MEDICATIONS      Current Outpatient Medications:      clobetasol (TEMOVATE) 0.05 % external ointment, PLEASE SEE ATTACHED FOR DETAILED DIRECTIONS,  Disp: , Rfl:      sulfacetamide-prednisoLONE (VASOCIDIN) 10-0.23 % ophthalmic solution, 4 drops to RIGHT ear 2x daily for 7 days, Disp: 5 mL, Rfl: 1     triamcinolone (KENALOG) 0.1 % external cream, Apply topically 2 times daily. For 5-7 days, Disp: 45 g, Rfl: 0     PAST MEDICAL HISTORY    PAST MEDICAL HISTORY:   Past Medical History:   Diagnosis Date     Tobacco dependence        PAST SURGICAL HISTORY    PAST SURGICAL HISTORY:   Past Surgical History:   Procedure Laterality Date     NO HISTORY OF SURGERY       OPEN REDUCTION INTERNAL FIXATION CALCANEOUS Bilateral 12/31/2018    Procedure: OPEN REDUCTION INTERNAL FIXATION of Bilateral CALCANEOUS Fractures;  Surgeon: Nicolas Pacheco DPM;  Location: [facility]       FAMILY  HISTORY    FAMILY HISTORY:   Family History   Problem Relation Age of Onset     No Known Problems Mother      No Known Problems Father        SOCIAL HISTORY    SOCIAL HISTORY:   Social History     Tobacco Use     Smoking status: Every Day     Current packs/day: 0.75     Average packs/day: 0.8 packs/day for 30.0 years (22.5 ttl pk-yrs)     Types: Cigarettes     Smokeless tobacco: Never   Substance Use Topics     Alcohol use: Yes     Comment: 2 drinks on the weekend        PHYSICAL EXAM    HEAD: Normal appearance and symmetry:  No cutaneous lesions.      NECK:  supple     EARS:    Right:   TM inflamed with purulent discharge (removed with #3 suction)   LEFT:  hard yellow crust over TM;  eardrum with central sclerosis (removed with angled pick)        EYES:  EOMI    CN VII/XII:  intact     NOSE:     Dorsum:   straight  Septum:  midline  Mucosa:  moist        ORAL CAVITY/OROPHARYNX:     Lips:  Normal.  Tongue: normal, midline  Mucosa:   no lesions     NECK:  Trachea:  midline.              Thyroid:  normal              Adenopathy:  none        NEURO:   Alert and Oriented     GAIT AND STATION:  normal     RESPIRATORY:   Symmetry and Respiratory effort     PSYCH:  Normal mood and affect     SKIN:   warm  and dry         IMPRESSION:    Encounter Diagnoses   Name Primary?     Lesion of ear      Myringitis, chronic, right Yes     Crusted tympanic membrane of both ears           RECOMMENDATIONS:      Orders Placed This Encounter   Procedures     [facility] EAR DEBRIDEMENT     Water precautions  Avoid Qtips in ears  Orders Placed This Encounter   Medications     clobetasol (TEMOVATE) 0.05 % external ointment     Sig: PLEASE SEE ATTACHED FOR DETAILED DIRECTIONS     sulfacetamide-prednisoLONE (VASOCIDIN) 10-0.23 % ophthalmic solution     Si drops to RIGHT ear 2x daily for 7 days     Dispense:  5 mL     Refill:  1      Return visit 8 weeks with repeat audiogram.   If right ear is still weepy, may need to use otic powder instead of drops.       Again, thank you for allowing me to participate in the care of your patient.        Sincerely,        Sedrick Christopher MD    Electronically signed

## 2025-01-20 NOTE — PROGRESS NOTES
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 Audio done 1/16. Ciprodex rx'd 4/30/24 for otitis externa, right then again in August. Thinks may have gotten hot tub water into ear. Ears won't pop, lots of ringing.          HISTORY OF PRESENT ILLNESS    Mario was seen at the behest of Viktoria Mata MD for ear concern.    AUDIOLOGY NOTE:      HISTORY: New to Dr. Christopher with appt 01/20/2024. Referred by PCP due to lesion of ear, with concern for possible cholesteatoma right. Pt has been treated for otitis externa and OME repeatedly since April. No prior audios. Pt reports that he initially had OM just in the right ear but that now it is both ears and still hasn't cleared. Feels his ears won't pop, has bilat aural fullness, bilat difficulty hearing. Has a family history of age-related hearing loss in his parents. Denies otalgia, otorrhea, dizziness, prior ear surgery, noise exposure. RESULTS: Otoscopy: Clear canals, abnormal TMs bilat. Tymps: Reduced complaince right, flat left with normal volume measurements. Reflexes: Right ipsi elevated, left ipsi absent at 1000 Hz (DNT contra due to equipment). Audio: Right normal hearing sloping to mild SNHL, with an apparent conductive component at 1000 Hz. Left mod conductive hearing loss rising to normal hearing sloping to moderate mixed loss. REC: To ENT as planned. Recheck per ENT.          REVIEW OF SYSTEMS    Review of Systems as per HPI and PMHx, otherwise 10 system review system are negative.       ALLERGIES    Amoxicillin    CURRENT MEDICATIONS      Current Outpatient Medications:     clobetasol (TEMOVATE) 0.05 % external ointment, PLEASE SEE ATTACHED FOR DETAILED DIRECTIONS, Disp: , Rfl:     sulfacetamide-prednisoLONE (VASOCIDIN) 10-0.23 % ophthalmic solution, 4 drops to RIGHT ear 2x daily for 7 days, Disp: 5 mL, Rfl: 1    triamcinolone (KENALOG) 0.1 % external cream, Apply topically 2 times daily. For  5-7 days, Disp: 45 g, Rfl: 0     PAST MEDICAL HISTORY    PAST MEDICAL HISTORY:   Past Medical History:   Diagnosis Date    Tobacco dependence        PAST SURGICAL HISTORY    PAST SURGICAL HISTORY:   Past Surgical History:   Procedure Laterality Date    NO HISTORY OF SURGERY      OPEN REDUCTION INTERNAL FIXATION CALCANEOUS Bilateral 12/31/2018    Procedure: OPEN REDUCTION INTERNAL FIXATION of Bilateral CALCANEOUS Fractures;  Surgeon: Nicolas Pacheco DPM;  Location: [facility]       FAMILY  HISTORY    FAMILY HISTORY:   Family History   Problem Relation Age of Onset    No Known Problems Mother     No Known Problems Father        SOCIAL HISTORY    SOCIAL HISTORY:   Social History     Tobacco Use    Smoking status: Every Day     Current packs/day: 0.75     Average packs/day: 0.8 packs/day for 30.0 years (22.5 ttl pk-yrs)     Types: Cigarettes    Smokeless tobacco: Never   Substance Use Topics    Alcohol use: Yes     Comment: 2 drinks on the weekend        PHYSICAL EXAM    HEAD: Normal appearance and symmetry:  No cutaneous lesions.      NECK:  supple     EARS:    Right:   TM inflamed with purulent discharge (removed with #3 suction)   LEFT:  hard yellow crust over TM;  eardrum with central sclerosis (removed with angled pick)        EYES:  EOMI    CN VII/XII:  intact     NOSE:     Dorsum:   straight  Septum:  midline  Mucosa:  moist        ORAL CAVITY/OROPHARYNX:     Lips:  Normal.  Tongue: normal, midline  Mucosa:   no lesions     NECK:  Trachea:  midline.              Thyroid:  normal              Adenopathy:  none        NEURO:   Alert and Oriented     GAIT AND STATION:  normal     RESPIRATORY:   Symmetry and Respiratory effort     PSYCH:  Normal mood and affect     SKIN:   warm and dry         IMPRESSION:    Encounter Diagnoses   Name Primary?    Lesion of ear     Myringitis, chronic, right Yes    Crusted tympanic membrane of both ears           RECOMMENDATIONS:      Orders Placed This Encounter   Procedures    [facility]  EAR DEBRIDEMENT     Water precautions  Avoid Qtips in ears  Orders Placed This Encounter   Medications    clobetasol (TEMOVATE) 0.05 % external ointment     Sig: PLEASE SEE ATTACHED FOR DETAILED DIRECTIONS    sulfacetamide-prednisoLONE (VASOCIDIN) 10-0.23 % ophthalmic solution     Si drops to RIGHT ear 2x daily for 7 days     Dispense:  5 mL     Refill:  1      Return visit 8 weeks with repeat audiogram.   If right ear is still weepy, may need to use otic powder instead of drops.

## 2025-02-18 ENCOUNTER — TELEPHONE (OUTPATIENT)
Dept: OTOLARYNGOLOGY | Facility: CLINIC | Age: 60
End: 2025-02-18
Payer: COMMERCIAL

## 2025-02-23 ENCOUNTER — MYC MEDICAL ADVICE (OUTPATIENT)
Dept: FAMILY MEDICINE | Facility: CLINIC | Age: 60
End: 2025-02-23
Payer: COMMERCIAL

## 2025-02-23 ENCOUNTER — MYC REFILL (OUTPATIENT)
Dept: FAMILY MEDICINE | Facility: CLINIC | Age: 60
End: 2025-02-23
Payer: COMMERCIAL

## 2025-02-23 DIAGNOSIS — L30.9 DERMATITIS: ICD-10-CM

## 2025-02-24 RX ORDER — TRIAMCINOLONE ACETONIDE 1 MG/G
CREAM TOPICAL 2 TIMES DAILY
Qty: 45 G | Refills: 0 | Status: SHIPPED | OUTPATIENT
Start: 2025-02-24

## 2025-07-12 ENCOUNTER — HEALTH MAINTENANCE LETTER (OUTPATIENT)
Age: 60
End: 2025-07-12

## (undated) DEVICE — Device

## (undated) DEVICE — DRSG ADAPTIC 3X8" X3

## (undated) DEVICE — CAST PADDING 6" STERILE 9046S

## (undated) DEVICE — DRAPE STERI TOWEL SM 1000

## (undated) DEVICE — SYR 10ML FINGER CONTROL W/O NDL 309695

## (undated) DEVICE — GUIDEWIRE THREADED TROCAR TIP 1.35MM AR-8737-02

## (undated) DEVICE — DRILL BIT ARTHREX 2.5MM AR-8943-42

## (undated) DEVICE — PREP CHLORAPREP 26ML TINTED ORANGE  260815

## (undated) DEVICE — SU VICRYL 2-0 SH 27" UND J417H

## (undated) DEVICE — NDL 22GA 1.5"

## (undated) DEVICE — DRSG ADAPTIC 3X8" 6113

## (undated) DEVICE — PACK EXTREMITY LATEX FREE SOP32HFFCS

## (undated) DEVICE — CAST PLASTER SPLINT 4X15" 7394

## (undated) DEVICE — GLOVE PROTEXIS POWDER FREE 7.5 ORTHOPEDIC 2D73ET75

## (undated) DEVICE — DRAPE EXTREMITY W/ARMBOARD 29405

## (undated) DEVICE — GOWN LG DISP 9515

## (undated) DEVICE — DRILL BIT ARTHREX CAN 2.5MM AR-8737-09

## (undated) DEVICE — SU ETHILON 3-0 FS-1 18" 669H

## (undated) DEVICE — DECANTER VIAL 2006S

## (undated) DEVICE — GLOVE PROTEXIS W/NEU-THERA 8.0  2D73TE80

## (undated) DEVICE — DRAPE SHEET REV FOLD 3/4 9349

## (undated) DEVICE — BASIN SET MINOR DISP

## (undated) DEVICE — SPLINT FIBERGLASS 4X30" PRE-CUT RESIN 76430

## (undated) DEVICE — SOL NACL 0.9% IRRIG 1000ML BOTTLE 07138-09

## (undated) DEVICE — DRSG GAUZE 4X4" TRAY

## (undated) DEVICE — DRAPE C-ARM 60X42" 1013

## (undated) DEVICE — DRILL BIT ARTHREX BB TAK MTP THREADED AR-13226T

## (undated) DEVICE — BNDG ELASTIC 6"X5YDS UNSTERILE 6611-60

## (undated) DEVICE — SOL WATER IRRIG 1000ML BOTTLE 07139-09

## (undated) DEVICE — SU VICRYL 3-0 FS-1 27" J442H

## (undated) RX ORDER — FENTANYL CITRATE 50 UG/ML
INJECTION, SOLUTION INTRAMUSCULAR; INTRAVENOUS
Status: DISPENSED
Start: 2018-12-31

## (undated) RX ORDER — KETOROLAC TROMETHAMINE 30 MG/ML
INJECTION, SOLUTION INTRAMUSCULAR; INTRAVENOUS
Status: DISPENSED
Start: 2018-12-31

## (undated) RX ORDER — BUPIVACAINE HYDROCHLORIDE 5 MG/ML
INJECTION, SOLUTION PERINEURAL
Status: DISPENSED
Start: 2018-12-31

## (undated) RX ORDER — NALOXONE HYDROCHLORIDE 0.4 MG/ML
INJECTION, SOLUTION INTRAMUSCULAR; INTRAVENOUS; SUBCUTANEOUS
Status: DISPENSED
Start: 2018-12-31

## (undated) RX ORDER — CEFAZOLIN SODIUM 2 G/100ML
INJECTION, SOLUTION INTRAVENOUS
Status: DISPENSED
Start: 2018-12-31

## (undated) RX ORDER — HYDROXYZINE HYDROCHLORIDE 50 MG/1
TABLET, FILM COATED ORAL
Status: DISPENSED
Start: 2018-12-31

## (undated) RX ORDER — OXYCODONE AND ACETAMINOPHEN 5; 325 MG/1; MG/1
TABLET ORAL
Status: DISPENSED
Start: 2018-12-31

## (undated) RX ORDER — PROPOFOL 10 MG/ML
INJECTION, EMULSION INTRAVENOUS
Status: DISPENSED
Start: 2018-12-31

## (undated) RX ORDER — GABAPENTIN 300 MG/1
CAPSULE ORAL
Status: DISPENSED
Start: 2018-12-31

## (undated) RX ORDER — CELECOXIB 200 MG/1
CAPSULE ORAL
Status: DISPENSED
Start: 2018-12-31

## (undated) RX ORDER — DEXAMETHASONE SODIUM PHOSPHATE 4 MG/ML
INJECTION, SOLUTION INTRA-ARTICULAR; INTRALESIONAL; INTRAMUSCULAR; INTRAVENOUS; SOFT TISSUE
Status: DISPENSED
Start: 2018-12-31

## (undated) RX ORDER — GLYCOPYRROLATE 0.2 MG/ML
INJECTION, SOLUTION INTRAMUSCULAR; INTRAVENOUS
Status: DISPENSED
Start: 2018-12-31

## (undated) RX ORDER — ACETAMINOPHEN 325 MG/1
TABLET ORAL
Status: DISPENSED
Start: 2018-12-31

## (undated) RX ORDER — ONDANSETRON 2 MG/ML
INJECTION INTRAMUSCULAR; INTRAVENOUS
Status: DISPENSED
Start: 2018-12-31

## (undated) RX ORDER — LIDOCAINE HYDROCHLORIDE 10 MG/ML
INJECTION, SOLUTION INFILTRATION; PERINEURAL
Status: DISPENSED
Start: 2018-12-31